# Patient Record
Sex: FEMALE | Race: WHITE | NOT HISPANIC OR LATINO | Employment: FULL TIME | URBAN - METROPOLITAN AREA
[De-identification: names, ages, dates, MRNs, and addresses within clinical notes are randomized per-mention and may not be internally consistent; named-entity substitution may affect disease eponyms.]

---

## 2020-08-03 ENCOUNTER — OFFICE VISIT (OUTPATIENT)
Dept: OBGYN CLINIC | Facility: CLINIC | Age: 60
End: 2020-08-03
Payer: COMMERCIAL

## 2020-08-03 ENCOUNTER — APPOINTMENT (OUTPATIENT)
Dept: RADIOLOGY | Facility: CLINIC | Age: 60
End: 2020-08-03
Payer: COMMERCIAL

## 2020-08-03 VITALS
TEMPERATURE: 98.1 F | SYSTOLIC BLOOD PRESSURE: 146 MMHG | BODY MASS INDEX: 35.78 KG/M2 | HEART RATE: 71 BPM | DIASTOLIC BLOOD PRESSURE: 82 MMHG | HEIGHT: 64 IN | WEIGHT: 209.6 LBS

## 2020-08-03 DIAGNOSIS — M25.512 LEFT SHOULDER PAIN, UNSPECIFIED CHRONICITY: ICD-10-CM

## 2020-08-03 DIAGNOSIS — S46.012A STRAIN OF LEFT ROTATOR CUFF CAPSULE, INITIAL ENCOUNTER: Primary | ICD-10-CM

## 2020-08-03 PROCEDURE — 20610 DRAIN/INJ JOINT/BURSA W/O US: CPT | Performed by: ORTHOPAEDIC SURGERY

## 2020-08-03 PROCEDURE — 73030 X-RAY EXAM OF SHOULDER: CPT

## 2020-08-03 PROCEDURE — 99204 OFFICE O/P NEW MOD 45 MIN: CPT | Performed by: ORTHOPAEDIC SURGERY

## 2020-08-03 RX ORDER — LATANOPROST 50 UG/ML
SOLUTION/ DROPS OPHTHALMIC
COMMUNITY
Start: 2020-06-02

## 2020-08-03 RX ORDER — LIDOCAINE HYDROCHLORIDE 10 MG/ML
4 INJECTION, SOLUTION INFILTRATION; PERINEURAL
Status: COMPLETED | OUTPATIENT
Start: 2020-08-03 | End: 2020-08-03

## 2020-08-03 RX ORDER — DEXAMETHASONE SODIUM PHOSPHATE 100 MG/10ML
40 INJECTION INTRAMUSCULAR; INTRAVENOUS
Status: COMPLETED | OUTPATIENT
Start: 2020-08-03 | End: 2020-08-03

## 2020-08-03 RX ADMIN — DEXAMETHASONE SODIUM PHOSPHATE 40 MG: 100 INJECTION INTRAMUSCULAR; INTRAVENOUS at 09:48

## 2020-08-03 RX ADMIN — LIDOCAINE HYDROCHLORIDE 4 ML: 10 INJECTION, SOLUTION INFILTRATION; PERINEURAL at 09:48

## 2020-08-03 NOTE — PROGRESS NOTES
Assessment/Plan:  1  Strain of left rotator cuff capsule, initial encounter  Ambulatory referral to Physical Therapy    Large joint arthrocentesis: L subacromial bursa   2  Left shoulder pain, unspecified chronicity  XR shoulder 2+ vw left       Scribe Attestation    I,:   Florin Funk am acting as a scribe while in the presence of the attending physician :        I,:   Sabine Gordon MD personally performed the services described in this documentation    as scribed in my presence :          X-rays of her left shoulder are overall benign  Upon physical examination, I do believe she is demonstrating signs and symptoms consistent with a left shoulder rotator cuff strain  I do believe we can begin treating her non operatively in the form of formal physical therapy  I did provide her with a prescription for this today in the office  She did inquire about a possible steroid injection, which I do believe is reasonable  I provided her with a subacromial steroid injection and she tolerated the procedure well  I do recommend that she ice her shoulder later today, as she may experience increased soreness  In addition, she may continue working without restriction and I did provide her with a work note indicating this  I will have her follow up back in the office in 4 weeks for repeat clinical evaluation  If she does not see progression at that time, we will consider ordering an MRI of her left shoulder to question a rotator cuff tear      Large joint arthrocentesis: L subacromial bursa  Date/Time: 8/3/2020 9:48 AM  Consent given by: patient  Site marked: site marked  Timeout: Immediately prior to procedure a time out was called to verify the correct patient, procedure, equipment, support staff and site/side marked as required   Supporting Documentation  Indications: pain   Procedure Details  Location: shoulder - L subacromial bursa  Preparation: Patient was prepped and draped in the usual sterile fashion  Needle size: 22 G  Ultrasound guidance: no  Approach: posterolateral  Medications administered: 4 mL lidocaine 1 %; 40 mg dexamethasone 100 mg/10 mL    Patient tolerance: patient tolerated the procedure well with no immediate complications  Dressing:  Sterile dressing applied        Subjective:   Lisa Addison is a 63 y o  female who presents to the office today for initial evaluation of left shoulder pain  She states 2 weeks ago she was changing out her air conditioner and experienced a sharp pain about her left shoulder  She states she has continued working since the time of injury and must take Aleve any use a max freeze cream daily for pain relief  At today's visit, she localizes majority of her pain along the superior aspect of her shoulder that radiates into her neck  She describes her pain as intermittent, achy, sore, occasionally sharp and moderate in intensity  She denies any previous injury to the shoulder  She notes icing and heating her shoulder as needed  She denies any radicular symptoms  She denies any numbness and tingling  Review of Systems   Constitutional: Positive for activity change  Negative for chills, fever and unexpected weight change  HENT: Negative for hearing loss, nosebleeds and sore throat  Eyes: Negative for pain, redness and visual disturbance  Respiratory: Negative for cough, shortness of breath and wheezing  Cardiovascular: Negative for chest pain, palpitations and leg swelling  Gastrointestinal: Negative for abdominal pain, nausea and vomiting  Endocrine: Negative for polydipsia and polyuria  Genitourinary: Negative for dysuria and hematuria  Musculoskeletal:        See HPI   Skin: Negative for rash and wound  Neurological: Negative for dizziness, numbness and headaches  Psychiatric/Behavioral: Negative for decreased concentration and suicidal ideas  The patient is not nervous/anxious            Past Medical History:   Diagnosis Date    Seasonal allergies        Past Surgical History:   Procedure Laterality Date    JOINT REPLACEMENT Left     knee       Family History   Problem Relation Age of Onset    Diabetes Mother     Heart disease Mother     Cancer Mother     Hypertension Father        Social History     Occupational History    Not on file   Tobacco Use    Smoking status: Never Smoker    Smokeless tobacco: Never Used   Substance and Sexual Activity    Alcohol use: Yes    Drug use: Never    Sexual activity: Not on file         Current Outpatient Medications:     latanoprost (XALATAN) 0 005 % ophthalmic solution, , Disp: , Rfl:     No Known Allergies    Objective:  Vitals:    08/03/20 0847   BP: 146/82   Pulse: 71   Temp: 98 1 °F (36 7 °C)       Left Shoulder Exam     Tenderness   The patient is experiencing no tenderness  Range of Motion   Passive abduction: 150   External rotation: 50   Forward flexion: 160   Internal rotation 0 degrees: L1   Internal rotation 90 degrees: 60     Muscle Strength   Abduction: 4/5   Internal rotation: 5/5   External rotation: 5/5     Tests   Ramírez test: negative  Impingement: positive (mild)  Drop arm: negative    Other   Erythema: absent  Scars: absent  Sensation: normal (Sensation to light touch throughout radial, ulnar and median nerve distributions )  Pulse: present (2+ radial)     Comments:    Empty Can (+)            Physical Exam   Constitutional: She is oriented to person, place, and time  She appears well-developed  HENT:   Head: Normocephalic and atraumatic  Eyes: Pupils are equal, round, and reactive to light  Conjunctivae are normal  Right eye exhibits no discharge  Left eye exhibits no discharge  Neck: Normal range of motion  Neck supple  Cardiovascular: Normal rate  Pulmonary/Chest: Effort normal  No respiratory distress  Musculoskeletal:      Comments: As noted in HPI  Neurological: She is alert and oriented to person, place, and time  Skin: Skin is warm and dry     Vitals reviewed  I have personally reviewed pertinent films in PACS and my interpretation is as follows:  X-rays of the left shoulder obtained on 08/03/2020 demonstrate no acute fracture, dislocation or osseous abnormality

## 2020-08-11 ENCOUNTER — APPOINTMENT (OUTPATIENT)
Dept: PHYSICAL THERAPY | Facility: CLINIC | Age: 60
End: 2020-08-11
Payer: COMMERCIAL

## 2020-08-11 ENCOUNTER — EVALUATION (OUTPATIENT)
Dept: PHYSICAL THERAPY | Facility: CLINIC | Age: 60
End: 2020-08-11
Payer: COMMERCIAL

## 2020-08-11 DIAGNOSIS — S46.012A STRAIN OF TENDON OF LEFT ROTATOR CUFF, INITIAL ENCOUNTER: Primary | ICD-10-CM

## 2020-08-11 PROCEDURE — 97162 PT EVAL MOD COMPLEX 30 MIN: CPT

## 2020-08-11 NOTE — PROGRESS NOTES
PT Evaluation     Today's date: 2020  Patient name: Charlee Grissom  : 1960  MRN: [id number]  Referring provider: Delfina Quiñones MD  Dx:   Encounter Diagnosis     ICD-10-CM    1  Strain of tendon of left rotator cuff, initial encounter  72) [id number] PT plan of care cert/re-cert       Start Time: 1700  Stop Time:  30 83  Total time in clinic (min): 45 minutes    Assessment  Assessment details: Patient is a 63year old female reporting to skilled PT with a diagnosed L rotator cuff pain and strain Based on evaluation today, patient presents with signs and symptoms consistent with these diagnosis  Patient presents with tenderness to palpation on the L upper trapezius as well as with her shoulder area  Patient notes pain with motions per objective, patient challenged with her overhead movements and functional limitations noted per objective  Patient notes that her most challenging motion is internal rotation and shows decreased strength with L rotator cuff motion, specifically with internal rotation  Patient advised to monitor overhead movements as well as advised to use tennis ball for upper trapezius spasm  Patient requires skilled PT to reduce shoulder pain, improve overhead motion, and to maximize function  Impairments: abnormal coordination, abnormal muscle firing, abnormal muscle tone, abnormal or restricted ROM, abnormal movement, activity intolerance, impaired physical strength, lacks appropriate home exercise program, pain with function, scapular dyskinesis, weight-bearing intolerance, poor posture  and poor body mechanics    Symptom irritability: lowUnderstanding of Dx/Px/POC: excellent   Prognosis: good    Goals  Short Term Goals- to be completed in 4 weeks or less  1  Patient will improve her FOTO score by 10 points or more in order to show a subjective amount of change in her function     2  Patient will improve her pain threshold by 2/10 or more in order to improve her function with workouts at home    3  Patient will improve her L shoulder ROM by 10 degrees or more in affected ROM planes in order to improve her overhead function  4  Patient will be independent in postural correction in order to reduce her postural faults  Long Term Goals- to be completed in 8 weeks or less  1  Patient will demonstrate an increase in L shoulder MMT but 1 grade or more with a pain free range in order to improve her strength in a pain free range  2  Patient will improve her L shoulder ROM by 20 degrees or more in each affected ROM in order to improve her shoulder motion for overhead motion  3  Patient will return to work pain free in order to improve her function at work  4  Patient will be independent in comprehensive HEP in order to manage her function outside of the clinic      Plan  Referral necessary: No  Planned modality interventions: biofeedback, cryotherapy, electrical stimulation/Russian stimulation, TENS and thermotherapy: hydrocollator packs  Planned therapy interventions: abdominal trunk stabilization, activity modification, ADL retraining, ADL training, balance, balance/weight bearing training, IADL retraining, joint mobilization, manual therapy, massage, Abel taping, motor coordination training, muscle pump exercises, neuromuscular re-education, patient education, postural training, body mechanics training, breathing training, canalith repositioning, compression, coordination, strengthening, stretching, fine motor coordination training, flexibility, functional ROM exercises, therapeutic activities, therapeutic exercise, therapeutic training, transfer training, gait training, graded activity, graded exercise, graded motor and home exercise program  Frequency: 2x week  Duration in weeks: 12  Plan of Care beginning date: 8/11/2020  Plan of Care expiration date: 11/3/2020  Treatment plan discussed with: patient        Subjective Evaluation    History of Present Illness  Date of onset: 2020  Mechanism of injury: trauma  Mechanism of injury: Patient is a 63year old female reporting to skilled PT with reports of L shoulder pain  According to Patient, she was lifting a large air conditioner at home, patient stated that she could not move her arm after a few minutes  Patient saw cortisone shot, patient states that it helped a little, 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, patient has been favoring her L shoulder and using more of her R in order to improve pain levels  Patient reports a "nagging pain"  Patient uses a heat and ice at home  Per MD note: "She states 2 weeks ago she was changing out her air conditioner and experienced a sharp pain about her left shoulder  She states she has continued working since the time of injury and must take Aleve any use a max freeze cream daily for pain relief  At today's visit, she localizes majority of her pain along the superior aspect of her shoulder that radiates into her neck  She describes her pain as intermittent, achy, sore, occasionally sharp and moderate in intensity  She denies any previous injury to the shoulder  She notes icing and heating her shoulder as needed  She denies any radicular symptoms    She denies any numbness and tingling "           Recurrent probem    Quality of life: excellent    Pain  Current pain rating: 3  At best pain ratin  At worst pain ratin  Location: L shoulder  Quality: dull ache  Relieving factors: change in position, heat, medications, ice, relaxation, support and rest  Aggravating factors: standing, overhead activity, keyboarding and lifting  Progression: no change    Social Support  Steps to enter house: yes  1  Stairs in house: yes   10  Lives in: multiple-level home  Lives with: alone    Employment status: working ( [employer])  Hand dominance: right      Diagnostic Tests  No diagnostic tests performed  Patient Goals  Patient goals for therapy: increased strength, independence with ADLs/IADLs, increased motion and decreased pain  Patient goal: Patient's goal is to improve her pain and to maximize her function  Objective     Static Posture     Head  Forward  Scapulae  Left winging, right winging, left protracted and right protracted  Postural Observations  Seated posture: fair  Standing posture: fair  Correction of posture: has no consistent effect        Palpation   Left   No palpable tenderness to the teres major and teres minor  Hypertonic in the cervical paraspinals, levator scapulae, pectoralis major, scalenes, sternocleidomastoid, suboccipitals, upper trapezius and masseter  Hypotonic in the lower trapezius, middle trapezius and serratus anterior  Muscle spasm in the rhomboids, scalenes, subscapularis and upper trapezius  Tenderness of the infraspinatus, subscapularis and supraspinatus  Trigger point to upper trapezius  Right   No palpable tenderness to the rhomboids, subscapularis, supraspinatus, teres major and teres minor  Hypertonic in the cervical paraspinals, pectoralis major, scalenes, sternocleidomastoid, suboccipitals, upper trapezius and masseter  Hypotonic in the levator scapulae, lower trapezius, middle trapezius and serratus anterior  Tenderness of the infraspinatus  Tenderness   Cervical Spine   Tenderness in the left scapula  No tenderness in the right scapula  Left Shoulder   Tenderness in the subscapularis tendon and supraspinatus tendon  No tenderness in the bicipital groove, clavicle, infraspinatus tendon and SC joint  Right Shoulder  No tenderness in the bicipital groove, clavicle, infraspinatus tendon, SC joint, subscapularis tendon and supraspinatus tendon       Neurological Testing     Sensation   Cervical/Thoracic   Left   Intact: light touch and sharp/dull discrimination    Right   Intact: light touch and sharp/dull discrimination    Reflexes   Left   Deltoid (C5): normal (2+)  Biceps (C5/C6): normal (2+)  Brachioradialis (C6): normal (2+)  Triceps (C7): normal (2+)  Lynn's reflex: negative    Right   Deltoid (C5): normal (2+)  Biceps (C5/C6): normal (2+)  Brachioradialis (C6): normal (2+)  Triceps (C7): normal (2+)  Lynn's reflex: negative    Active Range of Motion   Cervical/Thoracic Spine       Cervical    Left lateral flexion: 45 degrees      Right lateral flexion: 30 degrees     with pain Restriction level moderate  Left Shoulder   Flexion: WFL and with pain  Extension: WFL  Abduction: 165 degrees with pain  External rotation 0°: WFL  External rotation 45°: WFL  External rotation 90°: WFL  Internal rotation 90°: 70 degrees with pain    Right Shoulder   Flexion: WFL  Extension: WFL  Abduction: WFL    Joint Play     Hypomobile: C3, C4, C5, C6 and C7     Strength/Myotome Testing     Left Shoulder     Planes of Motion   Flexion: 4   Extension: 4+   Abduction: 4   Adduction: 4+   External rotation at 0°: 4   External rotation at 45°: 4+   External rotation at 90°: 4   External rotation BTH: 4+   Internal rotation at 0°: 4   Internal rotation at 45°: 4   Internal rotation at 90°: 4-   Internal rotation BTB: 4-     Isolated Muscles   Biceps: 5   Lower trapezius: 3+   Middle trapezius: 4-   Serratus anterior: 4-   Subscapularis: 4-   Supraspinatus: 4+   Triceps: 5   Upper trapezius: 5     Right Shoulder     Planes of Motion   Flexion: 4+   Extension: 4+   Abduction: 4+   Adduction: 4+   External rotation at 0°: 4+   External rotation at 45°: 4+   External rotation at 90°: 4+   External rotation BTH: 4+   Internal rotation at 0°: 4+   Internal rotation at 45°: 4+   Internal rotation at 90°: 4+     Isolated Muscles   Biceps: 5   Lower trapezius: 3+   Middle trapezius: 4-   Serratus anterior: 4-   Supraspinatus: 4+   Triceps: 5   Upper trapezius: 5     Tests     Left Shoulder   Negative belly press, drop arm, lift-off and Neer's  Right Shoulder   Negative belly press, drop arm, lift-off and Neer's  Flowsheet Rows      Most Recent Value   PT/OT G-Codes   Current Score  41   Projected Score  64             Precautions:   Past Medical History:   Diagnosis Date    Seasonal allergies            Manuals                                                                 Neuro Re-Ed                                                                                                        Ther Ex                                                                                                                     Ther Activity                                       Gait Training                                       Modalities

## 2020-08-17 ENCOUNTER — APPOINTMENT (OUTPATIENT)
Dept: PHYSICAL THERAPY | Facility: CLINIC | Age: 60
End: 2020-08-17
Payer: COMMERCIAL

## 2020-08-19 ENCOUNTER — APPOINTMENT (OUTPATIENT)
Dept: RADIOLOGY | Facility: CLINIC | Age: 60
End: 2020-08-19
Payer: COMMERCIAL

## 2020-08-19 ENCOUNTER — OFFICE VISIT (OUTPATIENT)
Dept: OBGYN CLINIC | Facility: CLINIC | Age: 60
End: 2020-08-19
Payer: COMMERCIAL

## 2020-08-19 VITALS
SYSTOLIC BLOOD PRESSURE: 154 MMHG | HEIGHT: 64 IN | DIASTOLIC BLOOD PRESSURE: 89 MMHG | BODY MASS INDEX: 35.78 KG/M2 | WEIGHT: 209.6 LBS | HEART RATE: 80 BPM | TEMPERATURE: 99.2 F

## 2020-08-19 DIAGNOSIS — M25.552 LEFT HIP PAIN: ICD-10-CM

## 2020-08-19 DIAGNOSIS — M70.62 GREATER TROCHANTERIC BURSITIS OF LEFT HIP: Primary | ICD-10-CM

## 2020-08-19 PROCEDURE — 73502 X-RAY EXAM HIP UNI 2-3 VIEWS: CPT

## 2020-08-19 PROCEDURE — 99214 OFFICE O/P EST MOD 30 MIN: CPT | Performed by: ORTHOPAEDIC SURGERY

## 2020-08-19 NOTE — PROGRESS NOTES
Assessment/Plan:  1  Greater trochanteric bursitis of left hip  Ambulatory referral to Physical Therapy    diclofenac sodium (VOLTAREN) 1 %   2  Left hip pain  XR hip/pelv 2-3 vws left if performed    Ambulatory referral to Physical Therapy    diclofenac sodium (VOLTAREN) 1 %     Scribe Attestation    I,:   Leodan Little am acting as a scribe while in the presence of the attending physician :        I,:   Kacey Bedoya, DO personally performed the services described in this documentation    as scribed in my presence :          Sanjuanita Osborn is a very pleasant 60-year-old female who presents today for initial evaluation of her left hip pain  After reviewing her imaging and performing a thorough history and physical exam, I explained that she is suffering with greater trochanteric bursitis about her left hip  We discussed treatment options and I recommended initiating formal physical therapy  I did provide her with a prescription for this today in the office  I also provided her with a prescription for Voltaren gel and instructions for use  I did also recommend that she take the next few days off from work in order to allow the medication to begin to take effect  She may return to work on 08/24/2020 without restriction  A note was provided for her employer  I would like to see her back in six weeks for repeat clinical evaluation  If her symptoms persist, we can consider corticosteroid injection  Subjective:  Initial evaluation for left hip pain    Patient ID: Barbara Connolly is a 63 y o  female who presents today for initial evaluation of her left hip pain  She states that she awoke this past weekend and began to experience sharp and severe pain about the lateral aspect of her left hip  She finds this pain is worse with twisting, walking, and most activities  She does state that the pain can radiate into her lateral upper leg at times    She does report a history of left total knee arthroplasty performed by Dr Ignacio Krueger  She denies any acute injury or trauma  She denies any distal paresthesias of the lower extremity  She denies any pain about her groin  Review of Systems   Constitutional: Positive for activity change  Negative for chills, fever and unexpected weight change  HENT: Negative for hearing loss, nosebleeds and sore throat  Eyes: Negative for pain, redness and visual disturbance  Respiratory: Negative for cough, shortness of breath and wheezing  Cardiovascular: Negative for chest pain, palpitations and leg swelling  Gastrointestinal: Negative for abdominal pain, nausea and vomiting  Endocrine: Negative for polydipsia and polyuria  Genitourinary: Negative for dysuria and hematuria  Musculoskeletal: Positive for arthralgias  Negative for joint swelling and myalgias  See HPI   Skin: Negative for rash and wound  Neurological: Negative for dizziness, numbness and headaches  Psychiatric/Behavioral: Negative for decreased concentration and suicidal ideas  The patient is not nervous/anxious            Past Medical History:   Diagnosis Date    Seasonal allergies        Past Surgical History:   Procedure Laterality Date    JOINT REPLACEMENT Left     knee       Family History   Problem Relation Age of Onset    Diabetes Mother     Heart disease Mother     Cancer Mother     Hypertension Father        Social History     Occupational History    Not on file   Tobacco Use    Smoking status: Never Smoker    Smokeless tobacco: Never Used   Substance and Sexual Activity    Alcohol use: Yes     Frequency: 4 or more times a week     Drinks per session: 1 or 2     Binge frequency: Daily or almost daily    Drug use: Not Currently    Sexual activity: Not on file         Current Outpatient Medications:     latanoprost (XALATAN) 0 005 % ophthalmic solution, , Disp: , Rfl:     Naproxen Sodium (ALEVE PO), Take by mouth, Disp: , Rfl:     diclofenac sodium (VOLTAREN) 1 %, Apply 2 g topically 4 (four) times a day, Disp: 1 Tube, Rfl: 1    No Known Allergies    Objective:  Vitals:    08/19/20 1122   BP: 154/89   Pulse: 80   Temp: 99 2 °F (37 3 °C)       Body mass index is 36 26 kg/m²  Left Hip Exam     Tenderness   The patient is experiencing tenderness in the greater trochanter (and proximal IT band)  Range of Motion   Abduction: normal   Adduction: normal   Flexion: normal   External rotation: normal   Internal rotation: normal     Muscle Strength   Abduction: 5/5   Adduction: 5/5   Flexion: 5/5     Tests   ESA: negative    Other   Erythema: absent  Scars: absent  Sensation: normal  Pulse: present    Comments:  Stinchfield: positive (lateral pain)  Log roll: negative  FADIR: negative            Physical Exam  Vitals signs and nursing note reviewed  Constitutional:       Appearance: She is well-developed  HENT:      Head: Normocephalic and atraumatic  Eyes:      Conjunctiva/sclera: Conjunctivae normal       Pupils: Pupils are equal, round, and reactive to light  Neck:      Musculoskeletal: Normal range of motion and neck supple  Cardiovascular:      Rate and Rhythm: Normal rate  Pulmonary:      Effort: Pulmonary effort is normal  No respiratory distress  Musculoskeletal:      Comments: As noted in HPI   Skin:     General: Skin is warm and dry  Neurological:      Mental Status: She is alert and oriented to person, place, and time  Psychiatric:         Behavior: Behavior normal          I have personally reviewed pertinent films in PACS  X-ray of the left hip obtained on 08/19/2020 demonstrates mild degenerative changes evidence by sclerosis and joint space narrowing with greater than 50% joint space remaining  There is no acute fracture, dislocation, lytic or blastic lesion

## 2020-08-19 NOTE — LETTER
August 19, 2020     Patient: Tiffanie Pérez   YOB: 1960   Date of Visit: 8/19/2020       To Whom it May Concern:    Claudealetharoxi Dentthom is under my professional care  She was seen in my office on 8/19/2020  She may return to work on 08/24/2020  If you have any questions or concerns, please don't hesitate to call           Sincerely,          Helena Greene,         CC: No Recipients

## 2020-08-20 NOTE — PROGRESS NOTES
Self Discharge    Patient has requested to discharge due to a high co pay and high deductible, cannot afford at this time

## 2020-08-25 ENCOUNTER — APPOINTMENT (OUTPATIENT)
Dept: PHYSICAL THERAPY | Facility: CLINIC | Age: 60
End: 2020-08-25
Payer: COMMERCIAL

## 2020-08-31 ENCOUNTER — APPOINTMENT (OUTPATIENT)
Dept: PHYSICAL THERAPY | Facility: CLINIC | Age: 60
End: 2020-08-31
Payer: COMMERCIAL

## 2020-09-08 ENCOUNTER — TELEPHONE (OUTPATIENT)
Dept: OTHER | Facility: OTHER | Age: 60
End: 2020-09-08

## 2020-09-08 NOTE — TELEPHONE ENCOUNTER
Inbound Calls to Reschedule/Cancel Appointments   Patient Details:  Dagoberto Quiñones  1960  [id number]     Who is calling? Ladi Kasper   Date of original appointment 9/9/2020   Visit Type Follow Up   Who is the Provider and Location? Dr Rogelio Tran @ [address] [place] [address]   The patient would like to cancel, reschedule or make into a virtual appointment?  Reschedule   Reason for rescheduling or cancelation? work

## 2021-04-17 ENCOUNTER — IMMUNIZATIONS (OUTPATIENT)
Dept: FAMILY MEDICINE CLINIC | Facility: HOSPITAL | Age: 61
End: 2021-04-17

## 2021-04-17 DIAGNOSIS — Z23 ENCOUNTER FOR IMMUNIZATION: Primary | ICD-10-CM

## 2021-04-17 PROCEDURE — 0001A SARS-COV-2 / COVID-19 MRNA VACCINE (PFIZER-BIONTECH) 30 MCG: CPT

## 2021-04-17 PROCEDURE — 91300 SARS-COV-2 / COVID-19 MRNA VACCINE (PFIZER-BIONTECH) 30 MCG: CPT

## 2021-05-08 ENCOUNTER — IMMUNIZATIONS (OUTPATIENT)
Dept: FAMILY MEDICINE CLINIC | Facility: HOSPITAL | Age: 61
End: 2021-05-08

## 2021-05-08 DIAGNOSIS — Z23 ENCOUNTER FOR IMMUNIZATION: Primary | ICD-10-CM

## 2021-05-08 PROCEDURE — 0002A SARS-COV-2 / COVID-19 MRNA VACCINE (PFIZER-BIONTECH) 30 MCG: CPT

## 2021-05-08 PROCEDURE — 91300 SARS-COV-2 / COVID-19 MRNA VACCINE (PFIZER-BIONTECH) 30 MCG: CPT

## 2023-01-12 ENCOUNTER — APPOINTMENT (OUTPATIENT)
Dept: RADIOLOGY | Facility: CLINIC | Age: 63
End: 2023-01-12

## 2023-01-12 ENCOUNTER — OCCMED (OUTPATIENT)
Dept: URGENT CARE | Facility: CLINIC | Age: 63
End: 2023-01-12

## 2023-01-12 DIAGNOSIS — M23.300 DEGENERATIVE TEAR OF LATERAL MENISCUS OF RIGHT KNEE: Primary | ICD-10-CM

## 2023-01-12 DIAGNOSIS — M23.300 DEGENERATIVE TEAR OF LATERAL MENISCUS OF RIGHT KNEE: ICD-10-CM

## 2023-01-16 ENCOUNTER — APPOINTMENT (OUTPATIENT)
Dept: URGENT CARE | Facility: CLINIC | Age: 63
End: 2023-01-16

## 2023-01-19 ENCOUNTER — OFFICE VISIT (OUTPATIENT)
Dept: OBGYN CLINIC | Facility: CLINIC | Age: 63
End: 2023-01-19

## 2023-01-19 VITALS
HEART RATE: 82 BPM | WEIGHT: 190 LBS | TEMPERATURE: 99.1 F | HEIGHT: 64 IN | DIASTOLIC BLOOD PRESSURE: 102 MMHG | BODY MASS INDEX: 32.44 KG/M2 | SYSTOLIC BLOOD PRESSURE: 158 MMHG

## 2023-01-19 DIAGNOSIS — S83.241A OTHER TEAR OF MEDIAL MENISCUS, CURRENT INJURY, RIGHT KNEE, INITIAL ENCOUNTER: Primary | ICD-10-CM

## 2023-01-19 NOTE — PROGRESS NOTES
Orthopaedics Office Visit - New Patient Visit    ASSESSMENT/PLAN:    Assessment:   65year old female with right knee suspected meniscus tear sp twisting injury    Plan:   · Order placed today for patient to initiate outpatient Physical Therapy  · Patient provided a short hinged knee brace today to be worn as needed  · Patient was offered, accepted, and received a cortisone injection of the right knee  Risks and benefits of CSI were discussed with the patient  The corticosteroid injection was administered without any immediate complication and was well tolerated by the patient  This was done under sterile technique  Post injection instructions and expectations were discussed  It was explained to the patient that cortisone injections can be repeated as early as every 3 months  · A note was provided for the patient to remain completely out of work for the next three weeks  She may return to light duty 2/13/23  Return to work status should be re-evaluated at her next visit in 4 weeks  · If minimal to no improvement at her next visit, consider ordering MRI to further evaluate for meniscal tear or other injuries  To Do Next Visit:  Re-evaluation, anticipate full return to work    _____________________________________________________  CHIEF COMPLAINT:  Chief Complaint   Patient presents with   • Right Knee - Pain         SUBJECTIVE:  Kristi Howard is a 65 y o  female who presents for evaluation of right knee pain that began one week ago on 1/12/2022 while reaching to the contralateral side reaching for something and felt a crunch in her knee  She was in immediate pain and swelling, and unable to bear weight  Was seen and evaluated on the same day by [facility] at a [address] [place] [address] location where x-rays were obtained, she was provided crutches, and she was referred to Orthopedics for follow up care  Today she states her pain has been rated 7/10, sharp in nature, intermittent   She is currently using a walker for ambulatory assistance  Her pain is aggravated with any activity, ascending and descending stairs  She has used ice as needed for pain with minimal relief  She has found relief of arthritic knee pain with a three shot VISCO series 8 years ago  History of left knee replacement 7 years ago with good outcome  Denies paraesthesias, secondary injury, previous injuries or surgeries  Notes a clicking sensation in the knee  Taking Ibuprofen as needed for pain  She states she did not feel stable using crutches so she has transitioned to a cane for ambulatory assistance  PAST MEDICAL HISTORY:  Past Medical History:   Diagnosis Date   • Seasonal allergies        PAST SURGICAL HISTORY:  Past Surgical History:   Procedure Laterality Date   • JOINT REPLACEMENT Left     knee       FAMILY HISTORY:  Family History   Problem Relation Age of Onset   • Diabetes Mother    • Heart disease Mother    • Cancer Mother    • Hypertension Father        SOCIAL HISTORY:  Social History     Tobacco Use   • Smoking status: Never   • Smokeless tobacco: Never   Vaping Use   • Vaping Use: Never used   Substance Use Topics   • Alcohol use: Yes   • Drug use: Not Currently       MEDICATIONS:    Current Outpatient Medications:   •  diclofenac sodium (VOLTAREN) 1 %, Apply 2 g topically 4 (four) times a day, Disp: 1 Tube, Rfl: 1  •  latanoprost (XALATAN) 0 005 % ophthalmic solution, , Disp: , Rfl:   •  Naproxen Sodium (ALEVE PO), Take by mouth, Disp: , Rfl:     ALLERGIES:  No Known Allergies    REVIEW OF SYSTEMS:  MSK: right knee pain  Neuro: none  Pertinent items are otherwise noted in HPI  A comprehensive review of systems was otherwise negative      LABS:  HgA1c: No results found for: HGBA1C  BMP: No results found for: GLUCOSE, CALCIUM, NA, K, CO2, CL, BUN, CREATININE  CBC: No components found for: CBC    _____________________________________________________  PHYSICAL EXAMINATION:  Vital signs: BP (!) 158/102   Pulse 82   Temp 99 1 °F (37 3 °C)  5' 3 75" (1 619 m)   Wt 86 2 kg (190 lb)   BMI 32 87 kg/m²   General: No acute distress, awake and alert  Psychiatric: Mood and affect appear appropriate  HEENT: Trachea Midline, No torticollis, no apparent facial trauma  Cardiovascular: No audible murmurs; Extremities appear perfused  Pulmonary: No audible wheezing or stridor  Skin: No open lesions; see further details (if any) below    MUSCULOSKELETAL EXAMINATION:  Extremities:   Right Knee Exam  Alignment:  Normal knee alignment  Inspection:  mild swelling  No erythema  No ecchymosis  No muscle atrophy  No deformity  Palpation:  mild tenderness at medial joint line, posteromedial corner  ROM:  Knee Extension 10  Knee Flexion 100  Strength:  5/5 quadriceps and hamstrings  Stability:  No objective knee instability  Stable Varus / Valgus stress, Lachman, and Posterior drawer  Tests:  (+) Himanshu  Patella:  Not tested  Neurovascular:  Sensation intact in DP/SP/Mcdonnell/Sa/T nerve distributions  2+ DP & PT pulses  Brisk capillary refill in all toes    Gait:  Not tested         _____________________________________________________  STUDIES REVIEWED:  I personally reviewed the images and interpretation is as follows:  No acute osseous abnormalities    PROCEDURES PERFORMED:  Procedures      Scribe Attestation    I,:  Namrata Moon am acting as a scribe while in the presence of the attending physician :       I,:  Royal Channing MD personally performed the services described in this documentation    as scribed in my presence :

## 2023-01-19 NOTE — LETTER
January 19, 2023     Patient: Sofía Drake  YOB: 1960  Date of Visit: 1/19/2023      To Whom it May Concern:    Avni Mccrary is under my professional care  Efra Carbajal was seen in my office on 1/19/2023  Efra Carbajal may not return to work at this time  She may return to work on 2/13/2023 with the following accomodations: allow her to sit as needed during a shift, no more than 4 hours of standing or walking over one shift  Her return to work status will be re-evaluated at her next visit in 4 weeks  If you have any questions or concerns, please don't hesitate to call           Sincerely,          Ld De MD        CC: No Recipients

## 2023-01-23 ENCOUNTER — APPOINTMENT (OUTPATIENT)
Dept: URGENT CARE | Facility: CLINIC | Age: 63
End: 2023-01-23

## 2023-02-01 ENCOUNTER — TELEPHONE (OUTPATIENT)
Dept: OBGYN CLINIC | Facility: HOSPITAL | Age: 63
End: 2023-02-01

## 2023-02-01 NOTE — TELEPHONE ENCOUNTER
Caller: Louann Hall     Doctor: Saskia Damon    Reason for call: Patient called she will  her note tomorrow afternoon       Call back#:

## 2023-02-01 NOTE — TELEPHONE ENCOUNTER
Caller: Patient    Doctor/Office: RAJESH/MG    #: [phone]      What needs to be faxed: PT script    ATTN to: Silver Pineda    Fax#: [phone]

## 2023-02-01 NOTE — TELEPHONE ENCOUNTER
Caller: Patient    Doctor: Tyrone Tucker    Reason for call: patient is starting PT tomorrow and it supposed to return to work 2/13  She would like to know if she can push the return to work out further so she can get some more PT in?     Call back#: [phone]

## 2023-02-01 NOTE — LETTER
February 1, 2023      Patient:            Willis Fortune  YOB: 1960  Date of Visit:    1/19/23        To Whom it May Concern:     Dudley Pereyra is under my professional care  Mariajose Stratton was seen in the office for Dr Wolfe Officer on 1/19/23   Mariajose Stratton may not return to work until otherwise stated      If you have any questions or concerns, please don't hesitate to call            Sincerely,         Dr Wolfe Officer, M D

## 2023-02-01 NOTE — TELEPHONE ENCOUNTER
Caller: Nabil Hernandez    Doctor: Raheem Carr    Reason for call: Returning call    Call back#: [phone]

## 2023-02-03 NOTE — TELEPHONE ENCOUNTER
I will re-print letter  Dr Lupe MATHIAS wrote the note but we do not have a stamp for [facility]  We will re-enter letter with Dr Lupe Byrd name and signature for patient to

## 2023-02-03 NOTE — TELEPHONE ENCOUNTER
Caller: Patient    Doctor: Rainer Jacobo    Reason for call: Patient called stating they would like another work letter with [employer], Patient is worried employer wont accept with PA-C signature   Patient asks if the letter can be more specific on the return to work, Please adivse    Call back#: [phone]

## 2023-02-16 ENCOUNTER — OFFICE VISIT (OUTPATIENT)
Dept: OBGYN CLINIC | Facility: CLINIC | Age: 63
End: 2023-02-16

## 2023-02-16 VITALS
HEART RATE: 83 BPM | BODY MASS INDEX: 38.58 KG/M2 | DIASTOLIC BLOOD PRESSURE: 95 MMHG | SYSTOLIC BLOOD PRESSURE: 151 MMHG | HEIGHT: 64 IN | WEIGHT: 226 LBS

## 2023-02-16 DIAGNOSIS — M17.11 PRIMARY OSTEOARTHRITIS OF RIGHT KNEE: ICD-10-CM

## 2023-02-16 DIAGNOSIS — S83.241A OTHER TEAR OF MEDIAL MENISCUS, CURRENT INJURY, RIGHT KNEE, INITIAL ENCOUNTER: Primary | ICD-10-CM

## 2023-02-16 NOTE — LETTER
February 16, 2023     Patient: Luiz Anne  YOB: 1960  Date of Visit: 2/16/2023      To Whom it May Concern:    Bobo Johnston is under my professional care  Birdie Hinds was seen in my office on 2/16/2023  Birdie Hinds may return to work on 3/6/23 with the following restrictions  No stairs  Begin with 6 hour work days at that time  Progress to 8 hour work days pending symptoms 3/20/23  If you have any questions or concerns, please don't hesitate to call           Sincerely,          Adelfa Boast, MD

## 2023-02-16 NOTE — PATIENT INSTRUCTIONS
-Weightbearing as tolerated right lower extremity  -Order placed for Euflexxa #3 right knee  -Continue physical therapy as directed  - Over the counter analgesics as needed / directed   - Ice / heat as directed   - Follow up for Euflexxa #1 right knee

## 2023-02-16 NOTE — PROGRESS NOTES
Orthopaedics Office Visit - Follow up Patient Visit    ASSESSMENT/PLAN:    Assessment:   Right knee osteoarthritis   Suspected medial meniscus tear s/p twisting injury   1/12/23 initially however now primarily complains of pain on stairs, less so mechanical symptoms  Continued medial sided knee pain       Plan:   -Weightbearing as tolerated right lower extremity  -Order placed for Euflexxa #3 right knee  -Continue physical therapy as directed  - Over the counter analgesics as needed / directed   - Ice / heat as directed   - Updated work note placed in system - patient will return to work on graduated system as outlined in letter  - Follow up for Euflexxa #1 right knee       To Do Next Visit:  Euflexxa #1 right knee     _____________________________________________________  CHIEF COMPLAINT:  Chief Complaint   Patient presents with   • Right Knee - Follow-up         SUBJECTIVE:  Britney García is a 65 y o  female who presents to the office for evaluation of her right knee  Patient states that her knee is doing slightly better in comparison to previous examination  Patient is approximately 4 weeks status post cortisone injection to the right knee  Patient states that she continues to have anterior and posterior knee pain that becomes worse with going up and down stairs  Patient states that she has been attending approximately 2 weeks of physical therapy with good results  Patient states that she has been taking ibuprofen and Tylenol as needed for pain  Patient does note a popping type sensation in her knee  Patient works as a   Patient offers no other complaints at this time         PAST MEDICAL HISTORY:  Past Medical History:   Diagnosis Date   • Seasonal allergies        PAST SURGICAL HISTORY:  Past Surgical History:   Procedure Laterality Date   • JOINT REPLACEMENT Left     knee       FAMILY HISTORY:  Family History   Problem Relation Age of Onset   • Diabetes Mother    • Heart disease Mother    • Cancer Mother    • Hypertension Father        SOCIAL HISTORY:  Social History     Tobacco Use   • Smoking status: Never   • Smokeless tobacco: Never   Vaping Use   • Vaping Use: Never used   Substance Use Topics   • Alcohol use: Yes   • Drug use: Not Currently       MEDICATIONS:    Current Outpatient Medications:   •  diclofenac sodium (VOLTAREN) 1 %, Apply 2 g topically 4 (four) times a day, Disp: 1 Tube, Rfl: 1  •  latanoprost (XALATAN) 0 005 % ophthalmic solution, , Disp: , Rfl:   •  Naproxen Sodium (ALEVE PO), Take by mouth, Disp: , Rfl:     ALLERGIES:  No Known Allergies    REVIEW OF SYSTEMS:  MSK: right knee pain   Neuro: WNL   Pertinent items are otherwise noted in HPI  A comprehensive review of systems was otherwise negative  LABS:  HgA1c: No results found for: HGBA1C  BMP: No results found for: GLUCOSE, CALCIUM, NA, K, CO2, CL, BUN, CREATININE  CBC: No components found for: CBC    _____________________________________________________  PHYSICAL EXAMINATION:  Vital signs: /95   Pulse 83   Ht 5' 3 75" (1 619 m)   Wt 103 kg (226 lb)   BMI 39 10 kg/m²   General: No acute distress, awake and alert  Psychiatric: Mood and affect appear appropriate  HEENT: Trachea Midline, No torticollis, no apparent facial trauma  Cardiovascular: No audible murmurs; Extremities appear perfused  Pulmonary: No audible wheezing or stridor  Skin: No open lesions; see further details (if any) below      MUSCULOSKELETAL EXAMINATION:  Right knee examination:  - Patient sitting comfortably in the office in no acute distress   -No acute visible abnormalities present in the right knee  Extremity appears well-perfused overall   -Tenderness palpation noted over the medial joint line    No other bony or soft tissue tenderness palpation noted at this time   -0 to 110 degrees of range of motion limited by pain  - Special Tests       -Ligamentously intact in all planes, equivocal medial Himanshu's examination  - NV intact    _____________________________________________________  STUDIES REVIEWED:  I personally reviewed the images and interpretation is as follows:  N/a         PROCEDURES PERFORMED:  No procedures were performed at this time  Dawna Kussmaul, PA-C - assisting  Maria C Clement                  Portions of the record may have been created with voice recognition software  Occasional wrong word or "sound a like" substitutions may have occurred due to the inherent limitations of voice recognition software  Read the chart carefully and recognize, using context, where substitutions have occurred

## 2023-02-21 ENCOUNTER — TELEPHONE (OUTPATIENT)
Dept: OBGYN CLINIC | Facility: CLINIC | Age: 63
End: 2023-02-21

## 2023-02-21 NOTE — TELEPHONE ENCOUNTER
Caller: Jey cordova     Doctor: [facility]     Reason for call: Called to check if any upcoming appts     Call back#: n/a

## 2023-02-27 ENCOUNTER — TELEPHONE (OUTPATIENT)
Dept: OBGYN CLINIC | Facility: CLINIC | Age: 63
End: 2023-02-27

## 2023-02-27 NOTE — TELEPHONE ENCOUNTER
LMOM that I was able to get her Return to Work Auth Form to go through and faxed, the FMLA form I was unable to fax to [employer] and or the number she gave on her form she filled out from our office  I called to advise to  both forms to give to her employer

## 2023-03-02 ENCOUNTER — PROCEDURE VISIT (OUTPATIENT)
Dept: OBGYN CLINIC | Facility: CLINIC | Age: 63
End: 2023-03-02

## 2023-03-02 VITALS
HEIGHT: 64 IN | DIASTOLIC BLOOD PRESSURE: 76 MMHG | HEART RATE: 75 BPM | WEIGHT: 226 LBS | SYSTOLIC BLOOD PRESSURE: 128 MMHG | BODY MASS INDEX: 38.58 KG/M2 | TEMPERATURE: 98.7 F

## 2023-03-02 DIAGNOSIS — M17.11 PRIMARY OSTEOARTHRITIS OF RIGHT KNEE: Primary | ICD-10-CM

## 2023-03-02 RX ORDER — HYALURONATE SODIUM 10 MG/ML
20 SYRINGE (ML) INTRAARTICULAR
Status: COMPLETED | OUTPATIENT
Start: 2023-03-02 | End: 2023-03-02

## 2023-03-02 RX ADMIN — Medication 20 MG: at 08:29

## 2023-03-02 NOTE — PROGRESS NOTES
[facility]      NAME: Tianna Sánchez is a 65 y o  female  : 1960    MRN: [id number]  DATE: 2023  TIME: 8:29 AM    Assessment and Plan   Primary osteoarthritis of right knee [M17 11]  1  Primary osteoarthritis of right knee  Large joint arthrocentesis: R knee          Large joint arthrocentesis: R knee  Universal Protocol:  Consent: Verbal consent obtained  Risks and benefits: risks, benefits and alternatives were discussed  Consent given by: patient  Patient understanding: patient states understanding of the procedure being performed  Site marked: the operative site was marked  Patient identity confirmed: verbally with patient    Supporting Documentation  Indications: pain   Procedure Details  Location: knee - R knee  Preparation: Patient was prepped and draped in the usual sterile fashion  Needle size: 22 G  Ultrasound guidance: no  Approach: anterolateral  Medications administered: 20 mg Sodium Hyaluronate 20 MG/2ML  Specialty Pharmacy Supplied: received medications from pharmacy  Patient tolerance: patient tolerated the procedure well with no immediate complications  Dressing:  Sterile dressing applied          Patient Instructions     - Follow up 1 week for Euflexxa #2 right knee   - Over the counter analgesics as needed / directed   - Ice / heat as directed       Chief Complaint     Chief Complaint   Patient presents with   • Right Knee - Follow-up         History of Present Illness       Patient is a 61-year-old female presents to the office for viscosupplementation  Euflexxa #1 right knee  Patient denies any new or worsening symptoms in the right knee  Patient offers no other complaints at this time           Current Medications       Current Outpatient Medications:   •  diclofenac sodium (VOLTAREN) 1 %, Apply 2 g topically 4 (four) times a day, Disp: 1 Tube, Rfl: 1  •  latanoprost (XALATAN) 0 005 % ophthalmic solution, , Disp: , Rfl:   •  Naproxen Sodium (ALEVE PO), Take by mouth, Disp: , Rfl:     Current Allergies     Allergies as of 03/02/2023   • (No Known Allergies)            The following portions of the patient's history were reviewed and updated as appropriate: allergies, current medications, past family history, past medical history, past social history, past surgical history and problem list      Past Medical History:   Diagnosis Date   • Primary osteoarthritis of right knee 2/16/2023   • Seasonal allergies        Past Surgical History:   Procedure Laterality Date   • JOINT REPLACEMENT Left     knee       Family History   Problem Relation Age of Onset   • Diabetes Mother    • Heart disease Mother    • Cancer Mother    • Hypertension Father          Medications have been verified  Objective   /76   Pulse 75   Temp 98 7 °F (37 1 °C)   Ht 5' 3 75" (1 619 m)   Wt 103 kg (226 lb)   BMI 39 10 kg/m²   No LMP recorded

## 2023-03-09 ENCOUNTER — PROCEDURE VISIT (OUTPATIENT)
Dept: OBGYN CLINIC | Facility: CLINIC | Age: 63
End: 2023-03-09

## 2023-03-09 VITALS
TEMPERATURE: 98.1 F | WEIGHT: 226 LBS | HEART RATE: 69 BPM | BODY MASS INDEX: 38.58 KG/M2 | DIASTOLIC BLOOD PRESSURE: 72 MMHG | HEIGHT: 64 IN | SYSTOLIC BLOOD PRESSURE: 135 MMHG

## 2023-03-09 DIAGNOSIS — M17.11 PRIMARY OSTEOARTHRITIS OF RIGHT KNEE: Primary | ICD-10-CM

## 2023-03-09 RX ORDER — HYALURONATE SODIUM 10 MG/ML
20 SYRINGE (ML) INTRAARTICULAR
Status: COMPLETED | OUTPATIENT
Start: 2023-03-09 | End: 2023-03-09

## 2023-03-09 RX ADMIN — Medication 20 MG: at 13:19

## 2023-03-09 NOTE — LETTER
March 9, 2023     Patient: Maricruz Keating  YOB: 1960  Date of Visit: 3/9/2023      To Whom it May Concern:    Michela Brown is under my professional care  Donald Topher was seen in my office on 3/9/2023  Tellyroxi Necessary may return to work on 3/9/23 with the following restrictions  No stairs  Continue with 6 hour work days at that time  Progress to 8 hour work days pending symptoms 4/3/23  If you have any questions or concerns, please don't hesitate to call           Sincerely,          Ny Augustine MD        CC: No Recipients

## 2023-03-09 NOTE — PROGRESS NOTES
[facility]      NAME: Nubia Kulkarni is a 65 y o  female  : 1960    MRN: [id number]  DATE: 2023  TIME: 1:19 PM    Assessment and Plan   Primary osteoarthritis of right knee [M17 11]  1  Primary osteoarthritis of right knee  Large joint arthrocentesis: R knee            Large joint arthrocentesis: R knee  Universal Protocol:  Consent: Verbal consent obtained  Risks and benefits: risks, benefits and alternatives were discussed  Consent given by: patient  Patient understanding: patient states understanding of the procedure being performed  Site marked: the operative site was marked  Patient identity confirmed: verbally with patient    Supporting Documentation  Indications: pain   Procedure Details  Location: knee - R knee  Preparation: Patient was prepped and draped in the usual sterile fashion  Needle size: 22 G  Ultrasound guidance: no  Approach: anterolateral  Medications administered: 20 mg Sodium Hyaluronate 20 MG/2ML  Specialty Pharmacy Supplied: received medications from pharmacy  Patient tolerance: patient tolerated the procedure well with no immediate complications  Dressing:  Sterile dressing applied              Patient Instructions     - Follow up 1 week for Euflexxa #3 right knee   - Over the counter analgesics as needed / directed   - Ice / heat as directed     Chief Complaint     Chief Complaint   Patient presents with   • Right Knee - Follow-up         History of Present Illness       Patient is a 64-year-old female presents to the office for viscosupplementation  Euflexxa #2 right knee  Patient denies any new or worsening symptoms in her knee  Patient offers no other complaints at this time        Current Medications       Current Outpatient Medications:   •  diclofenac sodium (VOLTAREN) 1 %, Apply 2 g topically 4 (four) times a day, Disp: 1 Tube, Rfl: 1  •  latanoprost (XALATAN) 0 005 % ophthalmic solution, , Disp: , Rfl:   •  Naproxen Sodium (ALEVE PO), Take by mouth, Disp: , Rfl:     Current Allergies     Allergies as of 03/09/2023   • (No Known Allergies)            The following portions of the patient's history were reviewed and updated as appropriate: allergies, current medications, past family history, past medical history, past social history, past surgical history and problem list      Past Medical History:   Diagnosis Date   • Primary osteoarthritis of right knee 2/16/2023   • Seasonal allergies        Past Surgical History:   Procedure Laterality Date   • JOINT REPLACEMENT Left     knee       Family History   Problem Relation Age of Onset   • Diabetes Mother    • Heart disease Mother    • Cancer Mother    • Hypertension Father          Medications have been verified  Objective   /72   Pulse 69   Temp 98 1 °F (36 7 °C)   Ht 5' 3 75" (1 619 m)   Wt 103 kg (226 lb)   BMI 39 10 kg/m²   No LMP recorded

## 2023-03-23 ENCOUNTER — PROCEDURE VISIT (OUTPATIENT)
Dept: OBGYN CLINIC | Facility: CLINIC | Age: 63
End: 2023-03-23

## 2023-03-23 VITALS
BODY MASS INDEX: 38.58 KG/M2 | TEMPERATURE: 98.2 F | WEIGHT: 226 LBS | HEART RATE: 71 BPM | HEIGHT: 64 IN | SYSTOLIC BLOOD PRESSURE: 128 MMHG | DIASTOLIC BLOOD PRESSURE: 76 MMHG

## 2023-03-23 DIAGNOSIS — M17.11 PRIMARY OSTEOARTHRITIS OF RIGHT KNEE: Primary | ICD-10-CM

## 2023-03-23 RX ORDER — HYALURONATE SODIUM 10 MG/ML
20 SYRINGE (ML) INTRAARTICULAR
Status: COMPLETED | OUTPATIENT
Start: 2023-03-23 | End: 2023-03-23

## 2023-03-23 RX ADMIN — Medication 20 MG: at 13:10

## 2023-03-23 NOTE — PROGRESS NOTES
[facility]      NAME: Jani Rey is a 65 y o  female  : 1960    MRN: [id number]  DATE: 2023  TIME: 1:10 PM    Assessment and Plan   No primary diagnosis found  No diagnosis found  Large joint arthrocentesis: R knee  Universal Protocol:  Consent: Verbal consent obtained  Risks and benefits: risks, benefits and alternatives were discussed  Consent given by: patient  Patient understanding: patient states understanding of the procedure being performed  Patient consent: the patient's understanding of the procedure matches consent given  Site marked: the operative site was marked  Patient identity confirmed: verbally with patient    Supporting Documentation  Indications: pain   Procedure Details  Location: knee - R knee  Preparation: Patient was prepped and draped in the usual sterile fashion  Needle size: 22 G  Ultrasound guidance: no  Approach: anteromedial  Medications administered: 20 mg Sodium Hyaluronate 20 MG/2ML    Patient tolerance: patient tolerated the procedure well with no immediate complications  Dressing:  Sterile dressing applied              Patient Instructions     - Follow up as needed, patient will likely also make appt for shoulder pain  - Over the counter analgesics as needed / directed   - Ice / heat as directed     Chief Complaint     Chief Complaint   Patient presents with   • Right Knee - Follow-up         History of Present Illness     Patient is a 71-year-old female presents to the office for viscosupplementation  Euflexxa #3 right knee  Patient denies any new or worsening symptoms in her knee  Patient offers no other complaints at this time      Current Medications       Current Outpatient Medications:   •  diclofenac sodium (VOLTAREN) 1 %, Apply 2 g topically 4 (four) times a day, Disp: 1 Tube, Rfl: 1  •  latanoprost (XALATAN) 0 005 % ophthalmic solution, , Disp: , Rfl:   •  Naproxen Sodium (ALEVE PO), Take by mouth, Disp: , Rfl:     Current Allergies     Allergies as of 03/23/2023   • (No Known Allergies)            The following portions of the patient's history were reviewed and updated as appropriate: allergies, current medications, past family history, past medical history, past social history, past surgical history and problem list      Past Medical History:   Diagnosis Date   • Primary osteoarthritis of right knee 2/16/2023   • Seasonal allergies        Past Surgical History:   Procedure Laterality Date   • JOINT REPLACEMENT Left     knee       Family History   Problem Relation Age of Onset   • Diabetes Mother    • Heart disease Mother    • Cancer Mother    • Hypertension Father          Medications have been verified  Objective   /76   Pulse 71   Temp 98 2 °F (36 8 °C)   Ht 5' 3 75" (1 619 m)   Wt 103 kg (226 lb)   BMI 39 10 kg/m²   No LMP recorded         Scribe Attestation    I,:  Deshawn Cannon am acting as a scribe while in the presence of the attending physician :       I,:  Angeline Kat MD personally performed the services described in this documentation    as scribed in my presence :

## 2023-03-23 NOTE — LETTER
March 23, 2023     Patient: Velma Montano  YOB: 1960  Date of Visit: 3/23/2023      To Whom it May Concern:    Francois Dalton is under my professional care  Ayaka Cardenas was seen in my office on 3/23/2023  Ayaka Cardenas may return to full duty without restriction and return to 8 hr duty as of 4/3/23  If you have any questions or concerns, please don't hesitate to call           Sincerely,          Kinjal Elizabeth MD        CC: No Recipients

## 2023-03-28 ENCOUNTER — TELEPHONE (OUTPATIENT)
Dept: OBGYN CLINIC | Facility: CLINIC | Age: 63
End: 2023-03-28

## 2023-03-28 NOTE — TELEPHONE ENCOUNTER
qi waldron cpmtact office regarding [employer] form  This is completed and ready for , did the patient want us to fax the form to the number on the page [phone]?

## 2023-07-27 ENCOUNTER — OFFICE VISIT (OUTPATIENT)
Dept: FAMILY MEDICINE CLINIC | Facility: CLINIC | Age: 63
End: 2023-07-27
Payer: COMMERCIAL

## 2023-07-27 VITALS
OXYGEN SATURATION: 96 % | DIASTOLIC BLOOD PRESSURE: 80 MMHG | WEIGHT: 225 LBS | BODY MASS INDEX: 38.41 KG/M2 | TEMPERATURE: 98.5 F | HEART RATE: 96 BPM | RESPIRATION RATE: 16 BRPM | SYSTOLIC BLOOD PRESSURE: 126 MMHG | HEIGHT: 64 IN

## 2023-07-27 DIAGNOSIS — M25.561 ACUTE PAIN OF RIGHT KNEE: ICD-10-CM

## 2023-07-27 DIAGNOSIS — Z12.12 ENCOUNTER FOR COLORECTAL CANCER SCREENING: ICD-10-CM

## 2023-07-27 DIAGNOSIS — H93.13 TINNITUS OF BOTH EARS: Primary | ICD-10-CM

## 2023-07-27 DIAGNOSIS — Z13.1 SCREENING FOR DIABETES MELLITUS: ICD-10-CM

## 2023-07-27 DIAGNOSIS — Z13.29 SCREENING FOR THYROID DISORDER: ICD-10-CM

## 2023-07-27 DIAGNOSIS — J45.909 MILD ASTHMA WITHOUT COMPLICATION, UNSPECIFIED WHETHER PERSISTENT: ICD-10-CM

## 2023-07-27 DIAGNOSIS — Z12.11 ENCOUNTER FOR COLORECTAL CANCER SCREENING: ICD-10-CM

## 2023-07-27 DIAGNOSIS — Z13.0 SCREENING FOR DEFICIENCY ANEMIA: ICD-10-CM

## 2023-07-27 DIAGNOSIS — Z13.220 SCREENING CHOLESTEROL LEVEL: ICD-10-CM

## 2023-07-27 DIAGNOSIS — Z12.31 ENCOUNTER FOR SCREENING MAMMOGRAM FOR BREAST CANCER: ICD-10-CM

## 2023-07-27 PROCEDURE — 99204 OFFICE O/P NEW MOD 45 MIN: CPT | Performed by: FAMILY MEDICINE

## 2023-07-27 RX ORDER — ALBUTEROL SULFATE 90 UG/1
2 AEROSOL, METERED RESPIRATORY (INHALATION) EVERY 6 HOURS PRN
Qty: 6.7 G | Refills: 5 | Status: SHIPPED | OUTPATIENT
Start: 2023-07-27

## 2023-07-27 NOTE — PROGRESS NOTES
Jeff Bush 1960 female MRN: [id number]    [address]      ASSESSMENT/PLAN  Jeff Bush is a 65 y.o. female presents to the office for    Diagnoses and all orders for this visit:    Tinnitus of both ears  Comments:  Referral was given to ENT. Education given on sources that could be causing the tinnitus. Also recommend audiology consult  Orders:  -     Ambulatory Referral to Otolaryngology; Future  -     Ambulatory Referral to Audiology; Future    Encounter for screening mammogram for breast cancer  -     Mammo screening bilateral w 3d & cad; Future    Encounter for colorectal cancer screening  -     Cologuard    Acute pain of right knee  Comments:  Recommend continuing recommendations of orthopedic    Screening for thyroid disorder  -     TSH, 3rd generation with Free T4 reflex; Future    Screening for diabetes mellitus  -     Comprehensive metabolic panel; Future    Screening cholesterol level  -     Lipid panel; Future    Screening for deficiency anemia  -     CBC and differential; Future    Mild asthma without complication, unspecified whether persistent  Comments:  Recommend a formal pulmonary function test.  If asthma is not present recommend a cardiac work-up  Orders:  -     Complete PFT with post bronchodilator; Future  -     albuterol (Proventil HFA) 90 mcg/act inhaler; Inhale 2 puffs every 6 (six) hours as needed for wheezing    Will see the patient back for her annual physical and to review labs         No future appointments. SUBJECTIVE  CC: Tinnitus      HPI:  Jeff Bush is a 65 y.o. female who presents for an establish care appointment. Patient's  is currently going her practice. Patient has had multiple knee concerns in the past and is being seen by orthopedics. She had a knee replacement in 2016 and a meniscus repair in her right knee.   Patient states that she has ongoing tinnitus for time has denied any recent infections. Does admit to loud noises and listening to music loud. Does use Aleve a lot for her knee injuries. Patient does have a history of asthma that was confirmed by pulmonary function test in the past.  Patient states that she gets short of breath when it is humid outside and the inhaler helps her and would like a refill if possible      Review of Systems   Constitutional: Negative for activity change, appetite change, chills, fatigue and fever. HENT: Negative for congestion. Respiratory: Negative for cough, chest tightness and shortness of breath. Cardiovascular: Negative for chest pain and leg swelling. Gastrointestinal: Negative for abdominal distention, abdominal pain, constipation, diarrhea, nausea and vomiting. All other systems reviewed and are negative. Historical Information   The patient history was reviewed as follows:  Past Medical History:   Diagnosis Date   • Primary osteoarthritis of right knee 2/16/2023   • Seasonal allergies          Medications:     Current Outpatient Medications:   •  albuterol (Proventil HFA) 90 mcg/act inhaler, Inhale 2 puffs every 6 (six) hours as needed for wheezing, Disp: 6.7 g, Rfl: 5  •  diclofenac sodium (VOLTAREN) 1 %, Apply 2 g topically 4 (four) times a day, Disp: 1 Tube, Rfl: 1  •  latanoprost (XALATAN) 0.005 % ophthalmic solution, , Disp: , Rfl:   •  Naproxen Sodium (ALEVE PO), Take by mouth, Disp: , Rfl:     No Known Allergies    OBJECTIVE  Vitals:   Vitals:    07/27/23 1515   BP: 126/80   BP Location: Left arm   Patient Position: Sitting   Cuff Size: Large   Pulse: 96   Resp: 16   Temp: 98.5 °F (36.9 °C)   SpO2: 96%   Weight: 102 kg (225 lb)   Height: 5' 3.75" (1.619 m)         Physical Exam  Vitals reviewed. Constitutional:       Appearance: She is well-developed. HENT:      Head: Normocephalic and atraumatic.    Eyes:      Conjunctiva/sclera: Conjunctivae normal.      Pupils: Pupils are equal, round, and reactive to light. Cardiovascular:      Rate and Rhythm: Normal rate and regular rhythm. Heart sounds: Normal heart sounds. Pulmonary:      Effort: Pulmonary effort is normal. No respiratory distress. Breath sounds: Normal breath sounds. Musculoskeletal:         General: Normal range of motion. Cervical back: Normal range of motion and neck supple. Skin:     General: Skin is warm. Capillary Refill: Capillary refill takes less than 2 seconds. Neurological:      Mental Status: She is alert and oriented to person, place, and time.                     Lee Rivers MD,   [facility]  7/28/2023

## 2023-08-16 ENCOUNTER — HOSPITAL ENCOUNTER (OUTPATIENT)
Dept: PULMONOLOGY | Facility: HOSPITAL | Age: 63
Discharge: HOME | End: 2023-08-16
Attending: FAMILY MEDICINE
Payer: COMMERCIAL

## 2023-08-16 DIAGNOSIS — J45.909 MILD ASTHMA WITHOUT COMPLICATION, UNSPECIFIED WHETHER PERSISTENT: ICD-10-CM

## 2023-08-16 PROCEDURE — 94729 DIFFUSING CAPACITY: CPT | Performed by: INTERNAL MEDICINE

## 2023-08-16 PROCEDURE — 94726 PLETHYSMOGRAPHY LUNG VOLUMES: CPT | Performed by: INTERNAL MEDICINE

## 2023-08-16 PROCEDURE — 94726 PLETHYSMOGRAPHY LUNG VOLUMES: CPT

## 2023-08-16 PROCEDURE — 94760 N-INVAS EAR/PLS OXIMETRY 1: CPT

## 2023-08-16 PROCEDURE — 94060 EVALUATION OF WHEEZING: CPT

## 2023-08-16 PROCEDURE — 94060 EVALUATION OF WHEEZING: CPT | Performed by: INTERNAL MEDICINE

## 2023-08-16 PROCEDURE — 94729 DIFFUSING CAPACITY: CPT

## 2023-08-16 RX ORDER — ALBUTEROL SULFATE 2.5 MG/3ML
2.5 SOLUTION RESPIRATORY (INHALATION) ONCE
Status: COMPLETED | OUTPATIENT
Start: 2023-08-16 | End: 2023-08-16

## 2023-08-16 RX ADMIN — ALBUTEROL SULFATE 2.5 MG: 2.5 SOLUTION RESPIRATORY (INHALATION) at 15:21

## 2023-08-22 DIAGNOSIS — J45.20 MILD INTERMITTENT ASTHMA WITHOUT COMPLICATION: ICD-10-CM

## 2023-08-22 DIAGNOSIS — H93.13 TINNITUS OF BOTH EARS: Primary | ICD-10-CM

## 2023-08-22 RX ORDER — FLUTICASONE PROPIONATE 110 UG/1
2 AEROSOL, METERED RESPIRATORY (INHALATION) 2 TIMES DAILY
Qty: 12 G | Refills: 0 | Status: SHIPPED | OUTPATIENT
Start: 2023-08-22

## 2023-08-30 ENCOUNTER — TELEPHONE (OUTPATIENT)
Dept: FAMILY MEDICINE CLINIC | Facility: CLINIC | Age: 63
End: 2023-08-30

## 2023-08-30 DIAGNOSIS — Z12.11 ENCOUNTER FOR COLORECTAL CANCER SCREENING: Primary | ICD-10-CM

## 2023-08-30 DIAGNOSIS — Z12.12 ENCOUNTER FOR COLORECTAL CANCER SCREENING: Primary | ICD-10-CM

## 2023-08-30 NOTE — TELEPHONE ENCOUNTER
Patient tried and is unable to do stool sample for cologuard, would like you to send order for colonoscopy.

## 2023-10-16 ENCOUNTER — OFFICE VISIT (OUTPATIENT)
Dept: OTOLARYNGOLOGY | Facility: CLINIC | Age: 63
End: 2023-10-16
Payer: COMMERCIAL

## 2023-10-16 ENCOUNTER — OFFICE VISIT (OUTPATIENT)
Dept: AUDIOLOGY | Facility: CLINIC | Age: 63
End: 2023-10-16
Payer: COMMERCIAL

## 2023-10-16 VITALS — BODY MASS INDEX: 37.56 KG/M2 | WEIGHT: 220 LBS | HEIGHT: 64 IN | TEMPERATURE: 97.8 F

## 2023-10-16 DIAGNOSIS — H90.3 SENSORINEURAL HEARING LOSS (SNHL), BILATERAL: Primary | ICD-10-CM

## 2023-10-16 DIAGNOSIS — H93.13 TINNITUS OF BOTH EARS: Primary | ICD-10-CM

## 2023-10-16 PROCEDURE — 99204 OFFICE O/P NEW MOD 45 MIN: CPT | Performed by: NURSE PRACTITIONER

## 2023-10-16 PROCEDURE — 92567 TYMPANOMETRY: CPT | Performed by: AUDIOLOGIST

## 2023-10-16 PROCEDURE — 92557 COMPREHENSIVE HEARING TEST: CPT | Performed by: AUDIOLOGIST

## 2023-10-16 NOTE — PATIENT INSTRUCTIONS
Tinnitus and possible causes including medications, viral illness, inner ear disorder, TMJ syndrome, or hearing changes. Options for bilateral tinnitus including adding background noise, tinnitus retraining therapy, masking device, Resound tinnitus lincoln, Acupuncture, or acceptance. Limit caffeine and ibuprofen intake. Discouraged q-tip use due to damage of ear canal.  Consider Flonase daily for eustachian tube dysfunction. No specific medications or surgery indicated for treatment of tinnitus.   Consider MRI imaging if worsens

## 2023-10-16 NOTE — PROGRESS NOTES
ADULT ENT HEARING EVALUATION - [address] AUDIOLOGY      Patient Name: Marjorie Whitney   MRN:  [id number]   :  1960   Age: 59 y.o. Gender: female   DOS: 10/16/2023     HISTORY:     Marjorie Whitney, a 59 y.o. female, was seen on 10/16/2023 at the referral of YAMILE Best,  for an evaluation of hearing as part of her ENT visit. Ms. Hazel García primary complaint is tinnitus bilaterally. She denied otalgia, otorrhea, aural fullness, and dizziness. Ms. Jesús Nicole has not had her hearing tested previously. RESULTS:    Otoscopic Evaluation:   Right Ear: Unremarkable, canal clear   Left Ear: Unremarkable, canal clear    Tympanometry:   Right Ear: Type A; normal middle ear pressure and static compliance    Left Ear: Type A; normal middle ear pressure and static compliance     Audiometry:  Conventional pure tone audiometry from 250 - 8000 Hz  obtained with good reliability and revealed the following:     Right Ear: normal to mild sensorineural hearing loss (SNHL)   Left Ear: normal to mild sensorineural hearing loss (SNHL)     Speech Audiometry:    Speech Reception (SRT)   Right Ear: 15 dB HL   Left Ear: 20 dB HL    Word Recognition Scores (WRS):  Right Ear: excellent (100 % correct)     Left Ear: excellent (100 % correct)   Stimuli: [id number]    *see attached audiogram*      RECOMMENDATIONS:    1.) Follow-up with referring provider. 2.) Make hearing exams a regular check up every 1-2 years for monitoring. It was a pleasure working with Ms. Jesús Nicole today. Thank you for referring this patient. Jonathan Ibarra, LOLIS  3rd year Audiology student    [facility] [address]   Clinical Audiologist    [facility]  [address] [place] [address]  [address] [id number]

## 2023-10-16 NOTE — PROGRESS NOTES
Assessment/Plan:    Bilateral tinnitus  No significant findings on exam.  No cerumen impaction, no serous fluid. Offered audiogram although pt noted hearing is normal.  Audiogram today reviewed and interpreted and indicating bilateral mild high frequency SNHL. Word discrimination 100%. Tymps type A bilaterally. Tinnitus most bothersome during the night. Reviewed nature of tinnitus and possible causes including medications, viral illness, inner ear disorder, TMJ syndrome, or hearing changes. Based on audiogram, suspect tinnitus due to bilateral high frequency SNHL along with pt reported increased home stress. Discussed options for bilateral tinnitus including adding background noise, tinnitus retraining therapy, masking device, Resound tinnitus lincoln, Acupuncture, or acceptance. Limit caffeine and ibuprofen intake. Discouraged q-tip use due to damage of eac. Consider Flonase daily for eustachian tube dysfunction. No specific medications or surgery indicated for treatment of tinnitus. Offered imaging if worsens         Diagnoses and all orders for this visit:    Tinnitus of both ears  Comments:  Referral was given to ENT. Education given on sources that could be causing the tinnitus. Also recommend audiology consult  Orders:  -     Ambulatory Referral to Otolaryngology  -     Ambulatory referral to Audiology          Subjective:      Patient ID: Tarsha Toney is a 59 y.o. female. Presents today as a new patient due to ear concerns. Occurring for about 3 months. Hearing gradually worsening. Bilateral ringing tinnitus. No otalgia or otorrhea. No history of ear surgery. No current hearing aids. Works in noisy environment.  Reports increased stress at home            The following portions of the patient's history were reviewed and updated as appropriate: allergies, current medications, past family history, past medical history, past social history, past surgical history, and problem list.    Review of Systems   Constitutional: Negative. HENT:  Positive for tinnitus. Negative for congestion, ear discharge, ear pain, hearing loss, nosebleeds, postnasal drip, rhinorrhea, sinus pressure, sinus pain, sore throat and voice change. Respiratory:  Negative for chest tightness and shortness of breath. Skin:  Negative for color change. Neurological:  Negative for dizziness, numbness and headaches. Psychiatric/Behavioral: Negative. Objective:      Temp 97.8 °F (36.6 °C) (Temporal)   Ht 5' 3.75" (1.619 m)   Wt 99.8 kg (220 lb)   BMI 38.06 kg/m²          Physical Exam  Constitutional:       Appearance: She is well-developed. HENT:      Head: Normocephalic. Right Ear: Hearing, tympanic membrane, ear canal and external ear normal. No decreased hearing noted. No drainage or tenderness. Tympanic membrane is not perforated or erythematous. Left Ear: Hearing, tympanic membrane, ear canal and external ear normal. No decreased hearing noted. No drainage or tenderness. Tympanic membrane is not perforated or erythematous. Nose: Nose normal. No nasal deformity or septal deviation. Mouth/Throat:      Mouth: Mucous membranes are not pale and not dry. No oral lesions. Dentition: Normal dentition. Pharynx: Uvula midline. No oropharyngeal exudate. Neck:      Trachea: No tracheal deviation. Pulmonary:      Effort: Pulmonary effort is normal. No accessory muscle usage or respiratory distress. Musculoskeletal:      Cervical back: Full passive range of motion without pain and neck supple. Lymphadenopathy:      Cervical: No cervical adenopathy. Skin:     General: Skin is warm and dry. Neurological:      Mental Status: She is alert and oriented to person, place, and time. Cranial Nerves: No cranial nerve deficit. Sensory: No sensory deficit. Psychiatric:         Behavior: Behavior is cooperative.

## 2023-10-16 NOTE — ASSESSMENT & PLAN NOTE
No significant findings on exam.  No cerumen impaction, no serous fluid. Offered audiogram although pt noted hearing is normal.  Audiogram today reviewed and interpreted and indicating bilateral mild high frequency SNHL. Word discrimination 100%. Tymps type A bilaterally. Tinnitus most bothersome during the night. Reviewed nature of tinnitus and possible causes including medications, viral illness, inner ear disorder, TMJ syndrome, or hearing changes. Based on audiogram, suspect tinnitus due to bilateral high frequency SNHL along with pt reported increased home stress. Discussed options for bilateral tinnitus including adding background noise, tinnitus retraining therapy, masking device, Resound tinnitus lincoln, Acupuncture, or acceptance. Limit caffeine and ibuprofen intake. Discouraged q-tip use due to damage of eac. Consider Flonase daily for eustachian tube dysfunction. No specific medications or surgery indicated for treatment of tinnitus.   Offered imaging if worsens

## 2023-11-14 ENCOUNTER — PREP FOR PROCEDURE (OUTPATIENT)
Age: 63
End: 2023-11-14

## 2023-11-14 ENCOUNTER — TELEPHONE (OUTPATIENT)
Age: 63
End: 2023-11-14

## 2023-11-14 DIAGNOSIS — Z12.11 SCREENING FOR COLON CANCER: Primary | ICD-10-CM

## 2023-11-14 NOTE — TELEPHONE ENCOUNTER
Scheduled date of colonoscopy (as of today):12/15/23    Physician performing colonoscopy:Dr Aguayo    Location of colonoscopy:[facility]    Bowel prep reviewed with patient:miralax/dulcolax    Instructions reviewed with patient by:prep instructions sent via email     Clearances: N/A

## 2023-11-14 NOTE — TELEPHONE ENCOUNTER
11/14/23  Screened by: Beverley Sweeney    Referring Provider     Pre- Screening: There is no height or weight on file to calculate BMI. Has patient been referred for a routine screening Colonoscopy? yes  Is the patient between 43-73 years old? yes      Previous Colonoscopy no   If yes:    Date:     Facility:     Reason:       SCHEDULING STAFF: If the patient is between 45yrs-49yrs, please advise patient to confirm benefits/coverage with their insurance company for a routine screening colonoscopy, some insurance carriers will only cover at [address] [place] [address] or older. If the patient is over 66years old, please schedule an office visit. Does the patient want to see a Gastroenterologist prior to their procedure OR are they having any GI symptoms? no    Has the patient been hospitalized or had abdominal surgery in the past 6 months? no    Does the patient use supplemental oxygen? no    Does the patient take Coumadin, Lovenox, Plavix, Elliquis, Xarelto, or other blood thinning medication? no    Has the patient had a stroke, cardiac event, or stent placed in the past year? no    SCHEDULING STAFF: If patient answers NO to above questions, then schedule procedure. If patient answers YES to above questions, then schedule office appointment. Pt passed OA    If patient is between 45yrs - 49yrs, please advise patient that we will have to confirm benefits & coverage with their insurance company for a routine screening colonoscopy.

## 2023-11-21 DIAGNOSIS — J45.20 MILD INTERMITTENT ASTHMA WITHOUT COMPLICATION: ICD-10-CM

## 2023-11-21 RX ORDER — DEXAMETHASONE 4 MG/1
TABLET ORAL
Qty: 12 G | Refills: 1 | Status: SHIPPED | OUTPATIENT
Start: 2023-11-21

## 2023-11-30 ENCOUNTER — HOSPITAL ENCOUNTER (OUTPATIENT)
Dept: RADIOLOGY | Facility: HOSPITAL | Age: 63
Discharge: HOME | End: 2023-11-30
Attending: FAMILY MEDICINE
Payer: COMMERCIAL

## 2023-11-30 VITALS — BODY MASS INDEX: 37.56 KG/M2 | HEIGHT: 64 IN | WEIGHT: 220 LBS

## 2023-11-30 DIAGNOSIS — Z12.31 ENCOUNTER FOR SCREENING MAMMOGRAM FOR BREAST CANCER: ICD-10-CM

## 2023-11-30 PROCEDURE — 77067 SCR MAMMO BI INCL CAD: CPT

## 2023-11-30 PROCEDURE — 77063 BREAST TOMOSYNTHESIS BI: CPT

## 2023-12-01 ENCOUNTER — ANESTHESIA EVENT (OUTPATIENT)
Dept: ANESTHESIOLOGY | Facility: HOSPITAL | Age: 63
End: 2023-12-01

## 2023-12-01 ENCOUNTER — TELEPHONE (OUTPATIENT)
Dept: FAMILY MEDICINE CLINIC | Facility: CLINIC | Age: 63
End: 2023-12-01

## 2023-12-01 ENCOUNTER — ANESTHESIA (OUTPATIENT)
Dept: ANESTHESIOLOGY | Facility: HOSPITAL | Age: 63
End: 2023-12-01

## 2023-12-01 NOTE — TELEPHONE ENCOUNTER
----- Message from Bob Hartman MD sent at 12/1/2023  9:00 AM EST -----  No mammographic evidence of malignancy; Repeat in 1 year

## 2023-12-06 ENCOUNTER — TELEPHONE (OUTPATIENT)
Age: 63
End: 2023-12-06

## 2023-12-06 NOTE — TELEPHONE ENCOUNTER
Pt called in stating she has a colonoscopy coming up and she doesn't have any instructions. I asked her to contact her GI dr for instructions. She understood.

## 2023-12-11 ENCOUNTER — TELEPHONE (OUTPATIENT)
Age: 63
End: 2023-12-11

## 2023-12-11 NOTE — TELEPHONE ENCOUNTER
Patients GI provider:  Dr. Ana Hooper    Number to return call: [phone]    Reason for call: Pt called stating that she did not receive her prep instructions. Sent TAMI/DUL prep istructions to [email or web address]    Scheduled procedure/appointment date if applicable: procedure 00/64/26

## 2023-12-15 ENCOUNTER — ANESTHESIA EVENT (OUTPATIENT)
Dept: GASTROENTEROLOGY | Facility: AMBULARY SURGERY CENTER | Age: 63
End: 2023-12-15

## 2023-12-15 ENCOUNTER — ANESTHESIA (OUTPATIENT)
Dept: GASTROENTEROLOGY | Facility: AMBULARY SURGERY CENTER | Age: 63
End: 2023-12-15

## 2023-12-15 ENCOUNTER — HOSPITAL ENCOUNTER (OUTPATIENT)
Dept: GASTROENTEROLOGY | Facility: AMBULARY SURGERY CENTER | Age: 63
Setting detail: OUTPATIENT SURGERY
End: 2023-12-15
Attending: INTERNAL MEDICINE
Payer: COMMERCIAL

## 2023-12-15 VITALS
RESPIRATION RATE: 18 BRPM | TEMPERATURE: 97.3 F | OXYGEN SATURATION: 98 % | SYSTOLIC BLOOD PRESSURE: 134 MMHG | HEART RATE: 76 BPM | DIASTOLIC BLOOD PRESSURE: 79 MMHG

## 2023-12-15 DIAGNOSIS — Z12.11 SCREENING FOR COLON CANCER: ICD-10-CM

## 2023-12-15 PROCEDURE — G0121 COLON CA SCRN NOT HI RSK IND: HCPCS | Performed by: INTERNAL MEDICINE

## 2023-12-15 RX ORDER — PROPOFOL 10 MG/ML
INJECTION, EMULSION INTRAVENOUS AS NEEDED
Status: DISCONTINUED | OUTPATIENT
Start: 2023-12-15 | End: 2023-12-15

## 2023-12-15 RX ORDER — SODIUM CHLORIDE, SODIUM LACTATE, POTASSIUM CHLORIDE, CALCIUM CHLORIDE 600; 310; 30; 20 MG/100ML; MG/100ML; MG/100ML; MG/100ML
125 INJECTION, SOLUTION INTRAVENOUS CONTINUOUS
Status: DISCONTINUED | OUTPATIENT
Start: 2023-12-15 | End: 2023-12-19 | Stop reason: HOSPADM

## 2023-12-15 RX ORDER — LIDOCAINE HYDROCHLORIDE 10 MG/ML
INJECTION, SOLUTION EPIDURAL; INFILTRATION; INTRACAUDAL; PERINEURAL AS NEEDED
Status: DISCONTINUED | OUTPATIENT
Start: 2023-12-15 | End: 2023-12-15

## 2023-12-15 RX ORDER — SODIUM CHLORIDE, SODIUM LACTATE, POTASSIUM CHLORIDE, CALCIUM CHLORIDE 600; 310; 30; 20 MG/100ML; MG/100ML; MG/100ML; MG/100ML
125 INJECTION, SOLUTION INTRAVENOUS CONTINUOUS
Status: CANCELLED | OUTPATIENT
Start: 2023-12-15

## 2023-12-15 RX ADMIN — LIDOCAINE HYDROCHLORIDE 50 MG: 10 INJECTION, SOLUTION EPIDURAL; INFILTRATION; INTRACAUDAL; PERINEURAL at 10:01

## 2023-12-15 RX ADMIN — SODIUM CHLORIDE, SODIUM LACTATE, POTASSIUM CHLORIDE, AND CALCIUM CHLORIDE: .6; .31; .03; .02 INJECTION, SOLUTION INTRAVENOUS at 09:45

## 2023-12-15 RX ADMIN — PROPOFOL 100 MG: 10 INJECTION, EMULSION INTRAVENOUS at 10:02

## 2023-12-15 RX ADMIN — PROPOFOL 50 MG: 10 INJECTION, EMULSION INTRAVENOUS at 10:04

## 2023-12-15 RX ADMIN — PROPOFOL 50 MG: 10 INJECTION, EMULSION INTRAVENOUS at 10:06

## 2023-12-15 NOTE — H&P
History and Physical -  [facility]  Renny Wild 59 y.o. female MRN: [id number]        HPI: 60-year-old female was referred for screening colonoscopy. Regular bowel movements. Historical Information   Past Medical History:   Diagnosis Date    Mild asthma without complication     Primary osteoarthritis of right knee 02/16/2023    Seasonal allergies      Past Surgical History:   Procedure Laterality Date    JOINT REPLACEMENT Left     knee     Social History   Social History     Substance and Sexual Activity   Alcohol Use Yes     Social History     Substance and Sexual Activity   Drug Use Not Currently     Social History     Tobacco Use   Smoking Status Never   Smokeless Tobacco Never     Family History   Problem Relation Age of Onset    Melanoma Mother     Diabetes Mother     Heart disease Mother     Cancer Mother     Hypertension Father     No Known Problems Sister     No Known Problems Sister     No Known Problems Sister     No Known Problems Sister     No Known Problems Sister     Colon cancer Paternal Aunt     Melanoma Paternal Uncle     Breast cancer Maternal Aunt 48    No Known Problems Brother        Meds/Allergies     (Not in a hospital admission)      No Known Allergies    Objective     Blood pressure 160/71, pulse 75, temperature (!) 97.3 °F (36.3 °C), temperature source Temporal, resp. rate 18, SpO2 98%.     PHYSICAL EXAM:    Gen: NAD  CV: S1 & S2 normal, RRR  CHEST: Clear to auscultate  ABD: soft, NT/ND, good bowel sounds  EXT: no edema    ASSESSMENT:     Screening for colon cancer     PLAN:    Colonoscopy

## 2023-12-15 NOTE — ANESTHESIA POSTPROCEDURE EVALUATION
Post-Op Assessment Note    CV Status:  Stable  Pain Score: 0    Pain management: adequate       Mental Status:  Alert and awake   Hydration Status:  Euvolemic   PONV Controlled:  Controlled   Airway Patency:  Patent     Post Op Vitals Reviewed: Yes      Staff: Anesthesiologist               BP   111/71   Temp      Pulse  82   Resp   15   SpO2   97

## 2023-12-15 NOTE — ANESTHESIA PREPROCEDURE EVALUATION
Procedure:  COLONOSCOPY    Relevant Problems   PULMONARY   (+) Mild asthma without complication        Physical Exam    Airway    Mallampati score: II  TM Distance: >3 FB  Neck ROM: full     Dental   Comment: Denies loose teeth     Cardiovascular  Cardiovascular exam normal    Pulmonary  Pulmonary exam normal     Other Findings  Portions of exam deferred due to low yield and/or unknown COVID statuspost-pubertal.      Anesthesia Plan  ASA Score- 2     Anesthesia Type- IV sedation with anesthesia with ASA Monitors. Additional Monitors:     Airway Plan:            Plan Factors-Exercise tolerance (METS): >4 METS. Chart reviewed. Existing labs reviewed. Patient summary reviewed. Patient is not a current smoker. Induction- intravenous. Postoperative Plan-     Informed Consent- Anesthetic plan and risks discussed with patient. I personally reviewed this patient with the CRNA. Discussed and agreed on the Anesthesia Plan with the CRNA. Lilliam Rincon

## 2023-12-20 ENCOUNTER — TELEPHONE (OUTPATIENT)
Age: 63
End: 2023-12-20

## 2023-12-20 NOTE — TELEPHONE ENCOUNTER
Pt called to say she will be picking up the [facility] papers today in regards to her . I was already in the pts chart when she told me they were for her . Message sent in his chart.

## 2024-07-29 ENCOUNTER — OFFICE VISIT (OUTPATIENT)
Dept: FAMILY MEDICINE CLINIC | Facility: CLINIC | Age: 64
End: 2024-07-29
Payer: COMMERCIAL

## 2024-07-29 VITALS
HEIGHT: 64 IN | TEMPERATURE: 98 F | HEART RATE: 78 BPM | DIASTOLIC BLOOD PRESSURE: 90 MMHG | WEIGHT: 214 LBS | SYSTOLIC BLOOD PRESSURE: 150 MMHG | RESPIRATION RATE: 14 BRPM | BODY MASS INDEX: 36.54 KG/M2 | OXYGEN SATURATION: 96 %

## 2024-07-29 DIAGNOSIS — F41.8 SITUATIONAL ANXIETY: ICD-10-CM

## 2024-07-29 DIAGNOSIS — T63.444S BEE STING REACTION, UNDETERMINED INTENT, SEQUELA: Primary | ICD-10-CM

## 2024-07-29 DIAGNOSIS — R03.0 PREHYPERTENSION: ICD-10-CM

## 2024-07-29 PROCEDURE — 3725F SCREEN DEPRESSION PERFORMED: CPT | Performed by: FAMILY MEDICINE

## 2024-07-29 PROCEDURE — 99214 OFFICE O/P EST MOD 30 MIN: CPT | Performed by: FAMILY MEDICINE

## 2024-07-29 RX ORDER — CEPHALEXIN 500 MG/1
500 CAPSULE ORAL EVERY 12 HOURS SCHEDULED
Qty: 14 CAPSULE | Refills: 0 | Status: SHIPPED | OUTPATIENT
Start: 2024-07-29 | End: 2024-08-05

## 2024-07-29 RX ORDER — ALPRAZOLAM 0.25 MG/1
0.25 TABLET ORAL
Qty: 15 TABLET | Refills: 0 | Status: SHIPPED | OUTPATIENT
Start: 2024-07-29

## 2024-07-29 RX ORDER — METHYLPREDNISOLONE 4 MG/1
TABLET ORAL
Qty: 21 EACH | Refills: 0 | Status: SHIPPED | OUTPATIENT
Start: 2024-07-29

## 2024-07-29 NOTE — PROGRESS NOTES
Kinjal Earl 1960 female MRN: [id number]    [facility] OFFICE VISIT  [facility]      ASSESSMENT/PLAN  Kinjal Earl is a 63 y.o. female presents to the office for    Diagnoses and all orders for this visit:    Bee sting reaction, undetermined intent, sequela  -     methylPREDNISolone 4 MG tablet therapy pack; Use as directed on package  -     cephalexin (KEFLEX) 500 mg capsule; Take 1 capsule (500 mg total) by mouth every 12 (twelve) hours for 7 days    Prehypertension    Situational anxiety  -     ALPRAZolam (XANAX) 0.25 mg tablet; Take 1 tablet (0.25 mg total) by mouth daily at bedtime as needed for anxiety       Bee sting treatment shown above if no improvement to please notify our office  Monitor BP I do believe that the patient will likely benefit from blood pressure medication if it continues to be elevated at home at home  Situational anxiety sent in Xanax to be used.  Half a tablet as needed.  Did recommend patient to reach out to us.  I do believe she would likely will need to benefit from a low-dose Lexapro           Future Appointments   Date Time Provider Department Center   8/19/2024  2:30 PM Ambar Ott MD [facility]          SUBJECTIVE  CC: Bee Sting (Right hand and left foot red/swollen)      HPI:  Kinjal Earl is a 63 y.o. female who presents for an acute appointment.  Right hand and left foot swelling after seeing a bee.  States her right wrist is worse.  Patient states that she has never had high blood pressure.  She is very emotional today given that she feels like her anxiety has not been doing well since she has been caring for her  who has been very ill.  She does admit to taking half a dose of Xanax that did help her.  Patient states that it is just situational at this time  Review of Systems   Constitutional:  Negative for activity change, appetite change, chills, fatigue and fever.   HENT:   "Negative for congestion.    Respiratory:  Negative for cough, chest tightness and shortness of breath.    Cardiovascular:  Negative for chest pain and leg swelling.   Gastrointestinal:  Negative for abdominal distention, abdominal pain, constipation, diarrhea, nausea and vomiting.   Musculoskeletal:  Positive for joint swelling.   All other systems reviewed and are negative.      Historical Information   The patient history was reviewed as follows:  Past Medical History:   Diagnosis Date   • Mild asthma without complication    • Primary osteoarthritis of right knee 02/16/2023   • Seasonal allergies          Medications:     Current Outpatient Medications:   •  albuterol (Proventil HFA) 90 mcg/act inhaler, Inhale 2 puffs every 6 (six) hours as needed for wheezing, Disp: 6.7 g, Rfl: 5  •  ALPRAZolam (XANAX) 0.25 mg tablet, Take 1 tablet (0.25 mg total) by mouth daily at bedtime as needed for anxiety, Disp: 15 tablet, Rfl: 0  •  cephalexin (KEFLEX) 500 mg capsule, Take 1 capsule (500 mg total) by mouth every 12 (twelve) hours for 7 days, Disp: 14 capsule, Rfl: 0  •  diclofenac sodium (VOLTAREN) 1 %, Apply 2 g topically 4 (four) times a day, Disp: 1 Tube, Rfl: 1  •  fluticasone (Flovent HFA) 110 MCG/ACT inhaler, INHALE TWO PUFFS BY MOUTH TWICE A DAY RINSE MOUTH AFTER USE, Disp: 12 g, Rfl: 1  •  latanoprost (XALATAN) 0.005 % ophthalmic solution, , Disp: , Rfl:   •  methylPREDNISolone 4 MG tablet therapy pack, Use as directed on package, Disp: 21 each, Rfl: 0  •  Naproxen Sodium (ALEVE PO), Take by mouth, Disp: , Rfl:     No Known Allergies    OBJECTIVE  Vitals:   Vitals:    07/29/24 1210   BP: 150/90   BP Location: Left arm   Patient Position: Sitting   Cuff Size: Adult   Pulse: 78   Resp: 14   Temp: 98 °F (36.7 °C)   TempSrc: Temporal   SpO2: 96%   Weight: 97.1 kg (214 lb)   Height: 5' 4\" (1.626 m)         Physical Exam  Vitals reviewed.   Constitutional:       Appearance: She is well-developed.   HENT:      Head: " Normocephalic and atraumatic.   Eyes:      Extraocular Movements: EOM normal.      Conjunctiva/sclera: Conjunctivae normal.      Pupils: Pupils are equal, round, and reactive to light.   Cardiovascular:      Rate and Rhythm: Normal rate and regular rhythm.      Heart sounds: Normal heart sounds, S1 normal and S2 normal. No murmur heard.  Pulmonary:      Effort: Pulmonary effort is normal. No respiratory distress.      Breath sounds: Normal breath sounds. No wheezing.   Musculoskeletal:         General: No edema. Normal range of motion.      Cervical back: Normal range of motion and neck supple.   Skin:     General: Skin is warm.      Comments: Right wrist swelling and erythema   Neurological:      Mental Status: She is alert and oriented to person, place, and time.   Psychiatric:         Mood and Affect: Mood and affect normal.         Speech: Speech normal.         Behavior: Behavior normal.         Thought Content: Thought content normal.         Judgment: Judgment normal.                    Ambar Odom MD,   [facility] [place]  7/29/2024

## 2024-08-16 ENCOUNTER — TELEPHONE (OUTPATIENT)
Age: 64
End: 2024-08-16

## 2024-08-16 NOTE — TELEPHONE ENCOUNTER
Patient calling to confirm her appt for MONDAY 8/19 -  she wanted to ask Dr. Odom if it would be ok if her  came with her - he was seen in the ER last Friday (nothing is wrong with him he is fine) and they rec a follow up. Please review if can accommodate both at this time

## 2024-08-19 ENCOUNTER — OFFICE VISIT (OUTPATIENT)
Dept: FAMILY MEDICINE CLINIC | Facility: CLINIC | Age: 64
End: 2024-08-19
Payer: COMMERCIAL

## 2024-08-19 VITALS
HEIGHT: 64 IN | OXYGEN SATURATION: 95 % | SYSTOLIC BLOOD PRESSURE: 154 MMHG | WEIGHT: 213 LBS | RESPIRATION RATE: 18 BRPM | TEMPERATURE: 98 F | BODY MASS INDEX: 36.37 KG/M2 | DIASTOLIC BLOOD PRESSURE: 84 MMHG | HEART RATE: 91 BPM

## 2024-08-19 DIAGNOSIS — Z00.00 PHYSICAL EXAM: Primary | ICD-10-CM

## 2024-08-19 DIAGNOSIS — F41.8 SITUATIONAL ANXIETY: ICD-10-CM

## 2024-08-19 DIAGNOSIS — Z13.29 SCREENING FOR THYROID DISORDER: ICD-10-CM

## 2024-08-19 DIAGNOSIS — E78.5 HYPERLIPIDEMIA, UNSPECIFIED HYPERLIPIDEMIA TYPE: ICD-10-CM

## 2024-08-19 DIAGNOSIS — I15.9 SECONDARY HYPERTENSION: ICD-10-CM

## 2024-08-19 PROCEDURE — 99396 PREV VISIT EST AGE 40-64: CPT | Performed by: FAMILY MEDICINE

## 2024-08-19 RX ORDER — AMLODIPINE BESYLATE 5 MG/1
5 TABLET ORAL DAILY
Qty: 30 TABLET | Refills: 0 | Status: SHIPPED | OUTPATIENT
Start: 2024-08-19

## 2024-08-19 NOTE — PROGRESS NOTES
Adult Annual Physical  Name: Kinjal Earl      : 1960      MRN: [id number]  Encounter Provider: Ambar Odom MD  Encounter Date: 2024   Encounter department: [facility]    Assessment & Plan   1. Physical exam  2. Situational anxiety  3. Secondary hypertension  -     amLODIPine (NORVASC) 5 mg tablet; Take 1 tablet (5 mg total) by mouth daily  -     CBC and differential; Future  -     Comprehensive metabolic panel; Future  4. Hyperlipidemia, unspecified hyperlipidemia type  -     Lipid panel; Future  5. Screening for thyroid disorder  -     TSH, 3rd generation with Free T4 reflex; Future  Reviewed blood pressure log unfortunately patient does require medications at this time.  Started on amlodipine 5 mg.  Did educate the patient that this is likely secondary to her EtOH usage.  Patient states that she will try to cut down  Situational anxiety recommend that the patient initiate another medication however she prefers only to have Xanax as a rescue right now  Hyperlipidemia/all screening labs sent         History of Present Illness     Adult Annual Physical:  Patient presents for annual physical. Patient does admit to drinking alcohol several days a week mostly on the weekends.  States that she has been very stressful with her 's overall health.  She has been using as a rescue the Xanax.  Has been keeping her blood pressure log and noticed that it has been significantly elevated.     Diet and Physical Activity:  - Diet/Nutrition: poor diet. snacker; 3-4 beers ; weekends has alot more.  - Exercise: no formal exercise.    General Health:  - Sleep: 4-6 hours of sleep on average.  - Hearing: normal hearing right ear, normal hearing left ear and tinnitus.  - Vision: no vision problems and wears glasses.  - Dental: regular dental visits.    Review of Systems   Constitutional:  Negative for activity change, appetite change, chills, fatigue and fever.   HENT:  Negative for  "congestion.    Respiratory:  Negative for cough, chest tightness and shortness of breath.    Cardiovascular:  Negative for chest pain and leg swelling.   Gastrointestinal:  Negative for abdominal distention, abdominal pain, constipation, diarrhea, nausea and vomiting.   All other systems reviewed and are negative.        Objective     /84 (BP Location: Left arm, Patient Position: Sitting, Cuff Size: Large)   Pulse 91   Temp 98 °F (36.7 °C) (Temporal)   Resp 18   Ht 5' 4\" (1.626 m)   Wt 96.6 kg (213 lb)   SpO2 95%   BMI 36.56 kg/m²     Physical Exam  Vitals reviewed.   Constitutional:       Appearance: Normal appearance. She is well-developed.   HENT:      Head: Normocephalic and atraumatic.      Right Ear: Tympanic membrane, ear canal and external ear normal. There is no impacted cerumen.      Left Ear: Tympanic membrane, ear canal and external ear normal. There is no impacted cerumen.      Nose: Nose normal.      Mouth/Throat:      Mouth: Mucous membranes are moist.      Pharynx: Oropharynx is clear.   Eyes:      Conjunctiva/sclera: Conjunctivae normal.      Pupils: Pupils are equal, round, and reactive to light.   Cardiovascular:      Rate and Rhythm: Normal rate and regular rhythm.      Heart sounds: Normal heart sounds.   Pulmonary:      Effort: Pulmonary effort is normal.      Breath sounds: Normal breath sounds.   Abdominal:      General: Abdomen is flat. Bowel sounds are normal.      Palpations: Abdomen is soft.   Musculoskeletal:         General: Normal range of motion.      Cervical back: Normal range of motion and neck supple.   Skin:     General: Skin is warm.      Capillary Refill: Capillary refill takes less than 2 seconds.   Neurological:      General: No focal deficit present.      Mental Status: She is alert and oriented to person, place, and time. Mental status is at baseline.   Psychiatric:         Mood and Affect: Mood normal.         Behavior: Behavior normal.         Thought " Content: Thought content normal.         Judgment: Judgment normal.

## 2024-08-31 ENCOUNTER — APPOINTMENT (OUTPATIENT)
Dept: LAB | Facility: HOSPITAL | Age: 64
End: 2024-08-31
Payer: COMMERCIAL

## 2024-08-31 DIAGNOSIS — E78.5 HYPERLIPIDEMIA, UNSPECIFIED HYPERLIPIDEMIA TYPE: ICD-10-CM

## 2024-08-31 DIAGNOSIS — I15.9 SECONDARY HYPERTENSION: ICD-10-CM

## 2024-08-31 DIAGNOSIS — Z13.29 SCREENING FOR THYROID DISORDER: ICD-10-CM

## 2024-08-31 LAB
ALBUMIN SERPL BCG-MCNC: 4.2 G/DL (ref 3.5–5)
ALP SERPL-CCNC: 50 U/L (ref 34–104)
ALT SERPL W P-5'-P-CCNC: 19 U/L (ref 7–52)
ANION GAP SERPL CALCULATED.3IONS-SCNC: 8 MMOL/L (ref 4–13)
AST SERPL W P-5'-P-CCNC: 17 U/L (ref 13–39)
BASOPHILS # BLD AUTO: 0.04 THOUSANDS/ÂΜL (ref 0–0.1)
BASOPHILS NFR BLD AUTO: 1 % (ref 0–1)
BILIRUB SERPL-MCNC: 0.48 MG/DL (ref 0.2–1)
BUN SERPL-MCNC: 13 MG/DL (ref 5–25)
CALCIUM SERPL-MCNC: 9.1 MG/DL (ref 8.4–10.2)
CHLORIDE SERPL-SCNC: 102 MMOL/L (ref 96–108)
CHOLEST SERPL-MCNC: 195 MG/DL
CO2 SERPL-SCNC: 26 MMOL/L (ref 21–32)
CREAT SERPL-MCNC: 0.61 MG/DL (ref 0.6–1.3)
EOSINOPHIL # BLD AUTO: 0.21 THOUSAND/ÂΜL (ref 0–0.61)
EOSINOPHIL NFR BLD AUTO: 4 % (ref 0–6)
ERYTHROCYTE [DISTWIDTH] IN BLOOD BY AUTOMATED COUNT: 12.5 % (ref 11.6–15.1)
GFR SERPL CREATININE-BSD FRML MDRD: 97 ML/MIN/1.73SQ M
GLUCOSE P FAST SERPL-MCNC: 97 MG/DL (ref 65–99)
HCT VFR BLD AUTO: 42.8 % (ref 34.8–46.1)
HDLC SERPL-MCNC: 60 MG/DL
HGB BLD-MCNC: 14.1 G/DL (ref 11.5–15.4)
IMM GRANULOCYTES # BLD AUTO: 0.02 THOUSAND/UL (ref 0–0.2)
IMM GRANULOCYTES NFR BLD AUTO: 0 % (ref 0–2)
LDLC SERPL CALC-MCNC: 114 MG/DL (ref 0–100)
LYMPHOCYTES # BLD AUTO: 1.71 THOUSANDS/ÂΜL (ref 0.6–4.47)
LYMPHOCYTES NFR BLD AUTO: 34 % (ref 14–44)
MCH RBC QN AUTO: 30.9 PG (ref 26.8–34.3)
MCHC RBC AUTO-ENTMCNC: 32.9 G/DL (ref 31.4–37.4)
MCV RBC AUTO: 94 FL (ref 82–98)
MONOCYTES # BLD AUTO: 0.51 THOUSAND/ÂΜL (ref 0.17–1.22)
MONOCYTES NFR BLD AUTO: 10 % (ref 4–12)
NEUTROPHILS # BLD AUTO: 2.59 THOUSANDS/ÂΜL (ref 1.85–7.62)
NEUTS SEG NFR BLD AUTO: 51 % (ref 43–75)
NONHDLC SERPL-MCNC: 135 MG/DL
NRBC BLD AUTO-RTO: 0 /100 WBCS
PLATELET # BLD AUTO: 354 THOUSANDS/UL (ref 149–390)
PMV BLD AUTO: 10.1 FL (ref 8.9–12.7)
POTASSIUM SERPL-SCNC: 4.1 MMOL/L (ref 3.5–5.3)
PROT SERPL-MCNC: 7.2 G/DL (ref 6.4–8.4)
RBC # BLD AUTO: 4.57 MILLION/UL (ref 3.81–5.12)
SODIUM SERPL-SCNC: 136 MMOL/L (ref 135–147)
TRIGL SERPL-MCNC: 106 MG/DL
TSH SERPL DL<=0.05 MIU/L-ACNC: 2 UIU/ML (ref 0.45–4.5)
WBC # BLD AUTO: 5.08 THOUSAND/UL (ref 4.31–10.16)

## 2024-08-31 PROCEDURE — 80061 LIPID PANEL: CPT

## 2024-08-31 PROCEDURE — 84443 ASSAY THYROID STIM HORMONE: CPT

## 2024-08-31 PROCEDURE — 36415 COLL VENOUS BLD VENIPUNCTURE: CPT

## 2024-08-31 PROCEDURE — 85025 COMPLETE CBC W/AUTO DIFF WBC: CPT

## 2024-08-31 PROCEDURE — 80053 COMPREHEN METABOLIC PANEL: CPT

## 2024-09-03 ENCOUNTER — TELEPHONE (OUTPATIENT)
Dept: FAMILY MEDICINE CLINIC | Facility: CLINIC | Age: 64
End: 2024-09-03

## 2024-09-03 NOTE — TELEPHONE ENCOUNTER
----- Message from Ambar Ott MD sent at 9/2/2024 11:56 PM EDT -----  Please advise patient labs are stable, except cholesterol. Recommend watching sugars and carbs to avoid this worsening.

## 2024-09-03 NOTE — TELEPHONE ENCOUNTER
Pt returned missed call.  Relayed message from Dr Odom.  Pt understood and had no questions.  She also confirmed her appt for Monday 9/9/24 with Dr Odom.

## 2024-09-09 ENCOUNTER — OFFICE VISIT (OUTPATIENT)
Dept: FAMILY MEDICINE CLINIC | Facility: CLINIC | Age: 64
End: 2024-09-09
Payer: COMMERCIAL

## 2024-09-09 VITALS
TEMPERATURE: 98 F | DIASTOLIC BLOOD PRESSURE: 70 MMHG | HEIGHT: 64 IN | RESPIRATION RATE: 18 BRPM | WEIGHT: 212.4 LBS | OXYGEN SATURATION: 99 % | SYSTOLIC BLOOD PRESSURE: 130 MMHG | HEART RATE: 97 BPM | BODY MASS INDEX: 36.26 KG/M2

## 2024-09-09 DIAGNOSIS — S00.522S: Primary | ICD-10-CM

## 2024-09-09 DIAGNOSIS — F41.8 SITUATIONAL ANXIETY: ICD-10-CM

## 2024-09-09 DIAGNOSIS — K05.10: Primary | ICD-10-CM

## 2024-09-09 DIAGNOSIS — I15.9 SECONDARY HYPERTENSION: ICD-10-CM

## 2024-09-09 PROCEDURE — 99214 OFFICE O/P EST MOD 30 MIN: CPT | Performed by: FAMILY MEDICINE

## 2024-09-09 RX ORDER — AMOXICILLIN 875 MG
875 TABLET ORAL 2 TIMES DAILY
Qty: 14 TABLET | Refills: 0 | Status: SHIPPED | OUTPATIENT
Start: 2024-09-09 | End: 2024-09-16

## 2024-09-09 NOTE — PROGRESS NOTES
Kinjal Earl 1960 female MRN: [id number]    [facility] PRACTICE OFFICE VISIT  [facility] - [facility]      ASSESSMENT/PLAN  Kinjal Earl is a 63 y.o. female presents to the office for    Diagnoses and all orders for this visit:    Blister of gum with infection, sequela  -     amoxicillin (AMOXIL) 875 mg tablet; Take 1 tablet (875 mg total) by mouth 2 (two) times a day for 7 days    Secondary hypertension    Situational anxiety       Hypertension at this time recommend that the patient continue on blood pressure medications.  Continue to reduce EtOH usage  Anxiety currently stable on Xanax as needed  Blistering of the gums with gingivitis recommend amoxicillin twice a day for 7 days along with Listerine.  And recommend contacting her dentist           Future Appointments   Date Time Provider Department Center   12/12/2024  3:15 PM Ambar Ott MD [facility]          SUBJECTIVE  CC: Follow-up      HPI:  Kinjal Earl is a 63 y.o. female who presents for blood pressure check.  Patient states that sometimes she forgets her pills but her yashira  always reminds her to take it.  Started with an infection in both teeth states that she usually brushes her teeth daily has been using Listerine with no improvement is established with a dentist.  Patient also feels like she has had swollen lymph nodes and difficulty swallowing. Has only taken two Xanax as needed.    Review of Systems   Constitutional:  Negative for activity change, appetite change, chills, fatigue and fever.   HENT:  Positive for dental problem and sore throat. Negative for congestion.    Respiratory:  Negative for cough, chest tightness and shortness of breath.    Cardiovascular:  Negative for chest pain and leg swelling.   Gastrointestinal:  Negative for abdominal distention, abdominal pain, constipation, diarrhea, nausea and vomiting.   All other systems reviewed and are  "negative.      Historical Information   The patient history was reviewed as follows:  Past Medical History:   Diagnosis Date   • Mild asthma without complication    • Primary osteoarthritis of right knee 02/16/2023   • Seasonal allergies          Medications:     Current Outpatient Medications:   •  ALPRAZolam (XANAX) 0.25 mg tablet, Take 1 tablet (0.25 mg total) by mouth daily at bedtime as needed for anxiety, Disp: 15 tablet, Rfl: 0  •  amLODIPine (NORVASC) 5 mg tablet, Take 1 tablet (5 mg total) by mouth daily, Disp: 30 tablet, Rfl: 0  •  amoxicillin (AMOXIL) 875 mg tablet, Take 1 tablet (875 mg total) by mouth 2 (two) times a day for 7 days, Disp: 14 tablet, Rfl: 0  •  fluticasone (Flovent HFA) 110 MCG/ACT inhaler, INHALE TWO PUFFS BY MOUTH TWICE A DAY RINSE MOUTH AFTER USE, Disp: 12 g, Rfl: 1  •  latanoprost (XALATAN) 0.005 % ophthalmic solution, , Disp: , Rfl:   •  Naproxen Sodium (ALEVE PO), Take by mouth, Disp: , Rfl:   •  albuterol (Proventil HFA) 90 mcg/act inhaler, Inhale 2 puffs every 6 (six) hours as needed for wheezing (Patient not taking: Reported on 8/19/2024), Disp: 6.7 g, Rfl: 5    No Known Allergies    OBJECTIVE  Vitals:   Vitals:    09/09/24 0956   BP: 130/70   Pulse: 97   Resp: 18   Temp: 98 °F (36.7 °C)   TempSrc: Temporal   SpO2: 99%   Weight: 96.3 kg (212 lb 6.4 oz)   Height: 5' 4\" (1.626 m)         Physical Exam  Vitals reviewed.   Constitutional:       Appearance: She is well-developed.   HENT:      Head: Normocephalic and atraumatic.      Mouth/Throat:      Comments: Front teeth  x 3 inflammed gums  Eyes:      Extraocular Movements: EOM normal.      Conjunctiva/sclera: Conjunctivae normal.      Pupils: Pupils are equal, round, and reactive to light.   Neck:      Comments: Left neck gland swelling     Cardiovascular:      Rate and Rhythm: Normal rate and regular rhythm.      Heart sounds: Normal heart sounds, S1 normal and S2 normal. No murmur heard.  Pulmonary:      Effort: Pulmonary " effort is normal. No respiratory distress.      Breath sounds: Normal breath sounds. No wheezing.   Musculoskeletal:         General: No edema. Normal range of motion.      Cervical back: Normal range of motion and neck supple.   Skin:     General: Skin is warm.      Comments: Abrasion not infection on right nose canal   Neurological:      Mental Status: She is alert and oriented to person, place, and time.   Psychiatric:         Mood and Affect: Mood and affect normal.         Speech: Speech normal.         Behavior: Behavior normal.         Thought Content: Thought content normal.         Judgment: Judgment normal.                    Ambar Odom MD,   [facility]  9/9/2024

## 2024-09-23 DIAGNOSIS — I15.9 SECONDARY HYPERTENSION: ICD-10-CM

## 2024-09-24 RX ORDER — AMLODIPINE BESYLATE 5 MG/1
5 TABLET ORAL DAILY
Qty: 90 TABLET | Refills: 2 | Status: SHIPPED | OUTPATIENT
Start: 2024-09-24

## 2024-09-24 NOTE — TELEPHONE ENCOUNTER
Refill must be reviewed and completed by the office or provider. The refill is unable to be approved or denied by the medication management team.    Patient was to return in 2W for BP check, appointment 12.2024 - Please review to see if the refill is appropriate.   Return in about 2 weeks (around 9/2/2024) for For BP check/EKG

## 2024-10-18 DIAGNOSIS — F41.8 SITUATIONAL ANXIETY: ICD-10-CM

## 2024-10-18 RX ORDER — ALPRAZOLAM 0.25 MG/1
0.25 TABLET ORAL
Qty: 15 TABLET | Refills: 0 | Status: SHIPPED | OUTPATIENT
Start: 2024-10-18

## 2024-11-21 ENCOUNTER — TELEPHONE (OUTPATIENT)
Dept: FAMILY MEDICINE CLINIC | Facility: CLINIC | Age: 64
End: 2024-11-21

## 2024-11-21 NOTE — TELEPHONE ENCOUNTER
Pt called asking if you received forms for her FMLA ends 12-19-24 for [employer] she needs them faxed by Friday for next year.

## 2024-11-26 NOTE — TELEPHONE ENCOUNTER
Printed FMLA forms for patient to pickup copy of completed forms. No further action required  Una Knapp

## 2024-12-05 NOTE — TELEPHONE ENCOUNTER
Called patient and advised that Dr Odom just returned to office today after being out for over 1 week and that she is seeing patient and will review messages and forms when she is able to.

## 2024-12-05 NOTE — TELEPHONE ENCOUNTER
Patient is calling to check on status of paper work. Please follow up with patient. Thank you in advance.

## 2024-12-06 NOTE — TELEPHONE ENCOUNTER
Spoke with patient, dates for form are 12/20/24-12/19/25. Noted on form and faxed. Patient will  copy next week.

## 2024-12-06 NOTE — TELEPHONE ENCOUNTER
Left 2 voice messages for the patient to call me back.  I filled out the paperwork for her but I left flank the start date of the leave and the end date because I did not know if I should go off of the start date being December 15, 2024 given that the old one ended on December 14, 2024

## 2024-12-12 ENCOUNTER — TELEPHONE (OUTPATIENT)
Age: 64
End: 2024-12-12

## 2024-12-12 ENCOUNTER — TELEPHONE (OUTPATIENT)
Dept: OTHER | Facility: OTHER | Age: 64
End: 2024-12-12

## 2024-12-12 ENCOUNTER — OFFICE VISIT (OUTPATIENT)
Dept: FAMILY MEDICINE CLINIC | Facility: CLINIC | Age: 64
End: 2024-12-12
Payer: COMMERCIAL

## 2024-12-12 VITALS
SYSTOLIC BLOOD PRESSURE: 140 MMHG | TEMPERATURE: 97.3 F | HEART RATE: 70 BPM | DIASTOLIC BLOOD PRESSURE: 80 MMHG | RESPIRATION RATE: 18 BRPM | OXYGEN SATURATION: 97 % | HEIGHT: 63 IN | BODY MASS INDEX: 38.91 KG/M2 | WEIGHT: 219.6 LBS

## 2024-12-12 DIAGNOSIS — J45.909 MILD ASTHMA WITHOUT COMPLICATION, UNSPECIFIED WHETHER PERSISTENT: ICD-10-CM

## 2024-12-12 DIAGNOSIS — I10 HYPERTENSION, UNSPECIFIED TYPE: Primary | ICD-10-CM

## 2024-12-12 DIAGNOSIS — F41.8 SITUATIONAL ANXIETY: ICD-10-CM

## 2024-12-12 DIAGNOSIS — E78.5 HYPERLIPIDEMIA, UNSPECIFIED HYPERLIPIDEMIA TYPE: ICD-10-CM

## 2024-12-12 DIAGNOSIS — J45.20 MILD INTERMITTENT ASTHMA WITHOUT COMPLICATION: ICD-10-CM

## 2024-12-12 PROCEDURE — 99214 OFFICE O/P EST MOD 30 MIN: CPT | Performed by: FAMILY MEDICINE

## 2024-12-12 RX ORDER — FLUTICASONE PROPIONATE 110 UG/1
2 AEROSOL, METERED RESPIRATORY (INHALATION) 2 TIMES DAILY
Qty: 12 G | Refills: 1 | Status: SHIPPED | OUTPATIENT
Start: 2024-12-12

## 2024-12-12 RX ORDER — TAFLUPROST OPTHALMIC 0 MG/.3ML
SOLUTION/ DROPS OPHTHALMIC
COMMUNITY
Start: 2024-11-22

## 2024-12-12 RX ORDER — DORZOLAMIDE HYDROCHLORIDE AND TIMOLOL MALEATE PRESERVATIVE FREE 20; 5 MG/ML; MG/ML
SOLUTION/ DROPS OPHTHALMIC
COMMUNITY
Start: 2024-12-05

## 2024-12-12 RX ORDER — ALPRAZOLAM 0.25 MG/1
0.25 TABLET ORAL
Qty: 15 TABLET | Refills: 0 | Status: SHIPPED | OUTPATIENT
Start: 2024-12-12

## 2024-12-12 RX ORDER — LEVOFLOXACIN 500 MG/1
TABLET, FILM COATED ORAL
COMMUNITY
Start: 2024-12-09

## 2024-12-12 RX ORDER — ALBUTEROL SULFATE 90 UG/1
2 INHALANT RESPIRATORY (INHALATION) EVERY 6 HOURS PRN
Qty: 6.7 G | Refills: 5 | Status: SHIPPED | OUTPATIENT
Start: 2024-12-12

## 2024-12-12 NOTE — TELEPHONE ENCOUNTER
PT called to inform Dr watt about the 2 eye drops she is takin) Dorzolamide ASL timolol 2-0.5 soln  2) Tafluprost PF 0.001-5% soln    ThankYou

## 2024-12-12 NOTE — PROGRESS NOTES
Kinjal Earl 1960 female MRN: [id number]    [facility] PRACTICE OFFICE VISIT  [facility]      ASSESSMENT/PLAN  Kinjal Earl is a 64 y.o. female presents to the office for    Diagnoses and all orders for this visit:    Hypertension, unspecified type    Hyperlipidemia, unspecified hyperlipidemia type    Mild intermittent asthma without complication  -     fluticasone (Flovent HFA) 110 MCG/ACT inhaler; Inhale 2 puffs 2 (two) times a day Rinse mouth after use.    Situational anxiety  -     ALPRAZolam (XANAX) 0.25 mg tablet; Take 1 tablet (0.25 mg total) by mouth daily at bedtime as needed for anxiety    Mild asthma without complication, unspecified whether persistent  -     albuterol (Proventil HFA) 90 mcg/act inhaler; Inhale 2 puffs every 6 (six) hours as needed for wheezing    Other orders  -     levofloxacin (LEVAQUIN) 500 mg tablet  -     Dorzolamide HCl-Timolol Mal PF 2-0.5 % SOLN  -     Tafluprost, PF, 0.0015 % SOLN    Patient has no family history of thyroid cancer I strongly believe that the patient  Should be started on weight loss medications.  Patient has been trying to modify her diet but with no success.  Patient will go home and think about possibly being placed on a GLP-1.  Situational anxiety given that she has been caring for her .  Continue on Xanax as a rescue.  Does not want a controlled daily medication at this time.  Asthma is currently stable             Future Appointments   Date Time Provider Department Center   6/12/2025  2:30 PM Ambar Ott MD [facility]          SUBJECTIVE  CC: Blood Pressure Check      HPI:  Kinjal Earl is a 64 y.o. female who presents for an follow-up on chronic conditions.  Patient states that her blood pressure has been stable on the 5 mg.  She states that she denies any dizziness or lightheadedness.  She does still have anxiety specifically when caring for her .  Patient states  she has been trying to modify her diet for several years now without any success.  Review of Systems   Constitutional:  Negative for activity change, appetite change, chills, fatigue and fever.   HENT:  Negative for congestion.    Respiratory:  Negative for cough, chest tightness and shortness of breath.    Cardiovascular:  Negative for chest pain and leg swelling.   Gastrointestinal:  Negative for abdominal distention, abdominal pain, constipation, diarrhea, nausea and vomiting.   All other systems reviewed and are negative.      Historical Information   The patient history was reviewed as follows:  Past Medical History:   Diagnosis Date   • Mild asthma without complication    • Primary osteoarthritis of right knee 02/16/2023   • Seasonal allergies          Medications:     Current Outpatient Medications:   •  albuterol (Proventil HFA) 90 mcg/act inhaler, Inhale 2 puffs every 6 (six) hours as needed for wheezing, Disp: 6.7 g, Rfl: 5  •  ALPRAZolam (XANAX) 0.25 mg tablet, Take 1 tablet (0.25 mg total) by mouth daily at bedtime as needed for anxiety, Disp: 15 tablet, Rfl: 0  •  amLODIPine (NORVASC) 5 mg tablet, Take 1 tablet (5 mg total) by mouth daily, Disp: 90 tablet, Rfl: 2  •  fluticasone (Flovent HFA) 110 MCG/ACT inhaler, Inhale 2 puffs 2 (two) times a day Rinse mouth after use., Disp: 12 g, Rfl: 1  •  latanoprost (XALATAN) 0.005 % ophthalmic solution, , Disp: , Rfl:   •  levofloxacin (LEVAQUIN) 500 mg tablet, , Disp: , Rfl:   •  Dorzolamide HCl-Timolol Mal PF 2-0.5 % SOLN, , Disp: , Rfl:   •  Tafluprost, PF, 0.0015 % SOLN, , Disp: , Rfl:   •  tirzepatide (Zepbound) 2.5 mg/0.5 mL auto-injector, Inject 0.5 mL (2.5 mg total) under the skin once a week for 28 days, Disp: 2 mL, Rfl: 0    No Known Allergies    OBJECTIVE  Vitals:   Vitals:    12/12/24 1444   BP: 140/80   BP Location: Left arm   Patient Position: Sitting   Cuff Size: Large   Pulse: 70   Resp: 18   Temp: (!) 97.3 °F (36.3 °C)   TempSrc: Temporal   SpO2:  "97%   Weight: 99.6 kg (219 lb 9.6 oz)   Height: 5' 3\" (1.6 m)         Physical Exam  Vitals reviewed.   Constitutional:       Appearance: She is well-developed.   HENT:      Head: Normocephalic and atraumatic.   Eyes:      Conjunctiva/sclera: Conjunctivae normal.      Pupils: Pupils are equal, round, and reactive to light.   Cardiovascular:      Rate and Rhythm: Normal rate and regular rhythm.      Heart sounds: Normal heart sounds, S1 normal and S2 normal. No murmur heard.  Pulmonary:      Effort: Pulmonary effort is normal. No respiratory distress.      Breath sounds: Normal breath sounds. No wheezing.   Musculoskeletal:         General: Normal range of motion.      Cervical back: Normal range of motion and neck supple.   Skin:     General: Skin is warm.   Neurological:      Mental Status: She is alert and oriented to person, place, and time.   Psychiatric:         Speech: Speech normal.         Behavior: Behavior normal.         Thought Content: Thought content normal.         Judgment: Judgment normal.                    Ambar Ott MD,   [facility]  12/22/2024      "

## 2024-12-19 ENCOUNTER — TELEPHONE (OUTPATIENT)
Age: 64
End: 2024-12-19

## 2024-12-19 DIAGNOSIS — E78.5 HYPERLIPIDEMIA, UNSPECIFIED HYPERLIPIDEMIA TYPE: ICD-10-CM

## 2024-12-19 DIAGNOSIS — I10 HYPERTENSION, UNSPECIFIED TYPE: Primary | ICD-10-CM

## 2024-12-19 RX ORDER — TIRZEPATIDE 2.5 MG/.5ML
2.5 INJECTION, SOLUTION SUBCUTANEOUS WEEKLY
Qty: 2 ML | Refills: 0 | Status: SHIPPED | OUTPATIENT
Start: 2024-12-19 | End: 2025-01-16

## 2024-12-19 NOTE — TELEPHONE ENCOUNTER
Hi, If you can please have the doctor sign the office notes for dos 12/12/24 so we can process the PA for the patients medication. Please let the PA team know once completed. Thank you

## 2024-12-24 NOTE — TELEPHONE ENCOUNTER
PA for ZEPBOUND 2.5MG SUBMITTED to [employer]    via    []CMM-KEY:   [x]Surescripts-Case ID # [id number]   []Availity-Auth ID # NDC #   []Faxed to plan   []Other website   []Phone call Case ID #     [x]PA sent as URGENT    All office notes, labs and other pertaining documents and studies sent. Clinical questions answered. Awaiting determination from insurance company.     Turnaround time for your insurance to make a decision on your Prior Authorization can take 7-21 business days.

## 2024-12-26 NOTE — TELEPHONE ENCOUNTER
Hi Dr. Odom, insurance denied [facility] PA, there needs to be documentation that she's tried/participated in a weight loss program. If she has tried this, when you have a moment, please addend your notes and I can either resubmit or appeal.  Thank you!

## 2024-12-26 NOTE — TELEPHONE ENCOUNTER
SS closed PA, requested PA be RS.      PA Zepbound 2.5 MG/0.5ML RESUBMITTED     to [facility]     via    []CMM-KEY:    [x]Surescripts-Case ID # [id number]  []Availity-Auth ID #  NDC #    []Faxed to plan   []Other website    []Phone call Case ID #      []PA sent as URGENT    All office notes, labs and other pertaining documents and studies sent. Clinical questions answered. Awaiting determination from insurance company.     Turnaround time for your insurance to make a decision on your Prior Authorization can take 7-21 business days.

## 2024-12-26 NOTE — TELEPHONE ENCOUNTER
PA Zepbound 2.5 MG/0.5ML DENIED    Reason:(Screenshot if applicable)        Message sent to office clinical pool Yes    **Please follow up with your patient regarding denial and next steps**

## 2024-12-27 NOTE — TELEPHONE ENCOUNTER
PA Zepbound 2.5 MG/0.5ML APPEALED via     []CMM  []SS  [x]Letter sent to insurance via fax [phone]   []Other site or means      All necessary records sent. Will await response from insurance company    Turnaround time for a decision to be made on an appeal could take up to 30 business days

## 2025-02-19 DIAGNOSIS — F41.8 SITUATIONAL ANXIETY: ICD-10-CM

## 2025-02-19 RX ORDER — ALPRAZOLAM 0.25 MG/1
0.25 TABLET ORAL
Qty: 15 TABLET | Refills: 0 | Status: SHIPPED | OUTPATIENT
Start: 2025-02-19

## 2025-04-01 DIAGNOSIS — F41.8 SITUATIONAL ANXIETY: ICD-10-CM

## 2025-04-02 RX ORDER — ALPRAZOLAM 0.25 MG
TABLET ORAL
Qty: 15 TABLET | Refills: 0 | Status: SHIPPED | OUTPATIENT
Start: 2025-04-02

## 2025-04-09 ENCOUNTER — NEW PATIENT COMPREHENSIVE (OUTPATIENT)
Dept: URBAN - METROPOLITAN AREA CLINIC 6 | Facility: CLINIC | Age: 65
End: 2025-04-09

## 2025-04-09 DIAGNOSIS — H25.813: ICD-10-CM

## 2025-04-09 DIAGNOSIS — H43.813: ICD-10-CM

## 2025-04-09 DIAGNOSIS — H40.1132: ICD-10-CM

## 2025-04-09 PROCEDURE — 99204 OFFICE O/P NEW MOD 45 MIN: CPT

## 2025-04-09 PROCEDURE — 92134 CPTRZ OPH DX IMG PST SGM RTA: CPT | Mod: NC

## 2025-04-09 PROCEDURE — 92133 CPTRZD OPH DX IMG PST SGM ON: CPT

## 2025-04-09 PROCEDURE — 92020 GONIOSCOPY: CPT

## 2025-04-09 PROCEDURE — 76514 ECHO EXAM OF EYE THICKNESS: CPT

## 2025-04-09 ASSESSMENT — TONOMETRY
OD_IOP_MMHG: 10
OS_IOP_MMHG: 11

## 2025-04-09 ASSESSMENT — VISUAL ACUITY
OD_CC: 20/40
OS_CC: 20/40

## 2025-04-09 ASSESSMENT — PACHYMETRY
OS_CT_UM: 509
OD_CT_UM: 509

## 2025-04-18 DIAGNOSIS — I15.9 SECONDARY HYPERTENSION: ICD-10-CM

## 2025-04-18 DIAGNOSIS — R03.0 PREHYPERTENSION: ICD-10-CM

## 2025-04-18 DIAGNOSIS — J45.20 MILD INTERMITTENT ASTHMA WITHOUT COMPLICATION: ICD-10-CM

## 2025-04-18 DIAGNOSIS — J45.909 MILD ASTHMA WITHOUT COMPLICATION, UNSPECIFIED WHETHER PERSISTENT: ICD-10-CM

## 2025-04-18 DIAGNOSIS — E78.5 HYPERLIPIDEMIA, UNSPECIFIED HYPERLIPIDEMIA TYPE: ICD-10-CM

## 2025-04-18 DIAGNOSIS — I10 HYPERTENSION, UNSPECIFIED TYPE: Primary | ICD-10-CM

## 2025-04-18 RX ORDER — TIRZEPATIDE 2.5 MG/.5ML
2.5 INJECTION, SOLUTION SUBCUTANEOUS WEEKLY
Qty: 2 ML | Refills: 0 | Status: SHIPPED | OUTPATIENT
Start: 2025-04-18 | End: 2025-05-16

## 2025-04-18 NOTE — PROGRESS NOTES
Patient is curious given that we never got a result of her weight loss medication that we wanted to initiate.  She has been walking and exercising for at least 6 months and doing significant diet lifestyle modifications with no improvement.  So I been waiting to make sure she is approved for Zepbound given her history of asthma as well as high cholesterol and hypertension

## 2025-04-22 ENCOUNTER — TELEPHONE (OUTPATIENT)
Age: 65
End: 2025-04-22

## 2025-04-22 NOTE — TELEPHONE ENCOUNTER
PA for Zepbound 2.5mg SUBMITTED to [facility]    via    []CMM-KEY:   [x]Surescripts-Case ID # [id number]   []Availity-Auth ID # NDC #   []Faxed to plan   []Other website   []Phone call Case ID #     [x]PA sent as URGENT    All office notes, labs and other pertaining documents and studies sent. Clinical questions answered. Awaiting determination from insurance company.     Turnaround time for your insurance to make a decision on your Prior Authorization can take 7-21 business days.

## 2025-04-24 NOTE — TELEPHONE ENCOUNTER
PA  Zepbound 2.5mg DENIED    Reason:(Screenshot if applicable)        Message sent to office clinical pool Yes    Denial letter scanned into Media Yes    Appeal started No (Provider will need to decide if appeal is warranted and send clinical documentation to Prior Authorization Team for initiation.)    **Please follow up with your patient regarding denial and next steps**

## 2025-04-24 NOTE — TELEPHONE ENCOUNTER
Dr. Odom, we'll need Kinjal to come in for a weight follow up appointment with you. Insurance needs updated weight & bmi as well as chart notes showing she's been educated and has completed dieting and exercise. We can resubmit after she is seen. She may have to also go up to the 5 mg.

## 2025-05-08 DIAGNOSIS — F41.8 SITUATIONAL ANXIETY: ICD-10-CM

## 2025-05-08 RX ORDER — ALPRAZOLAM 0.25 MG
0.25 TABLET ORAL
Qty: 30 TABLET | Refills: 0 | Status: SHIPPED | OUTPATIENT
Start: 2025-05-08

## 2025-06-12 ENCOUNTER — OFFICE VISIT (OUTPATIENT)
Dept: FAMILY MEDICINE CLINIC | Facility: CLINIC | Age: 65
End: 2025-06-12
Payer: COMMERCIAL

## 2025-06-12 VITALS
HEIGHT: 63 IN | WEIGHT: 221 LBS | OXYGEN SATURATION: 97 % | SYSTOLIC BLOOD PRESSURE: 140 MMHG | TEMPERATURE: 98 F | DIASTOLIC BLOOD PRESSURE: 80 MMHG | RESPIRATION RATE: 14 BRPM | HEART RATE: 72 BPM | BODY MASS INDEX: 39.16 KG/M2

## 2025-06-12 DIAGNOSIS — F41.8 SITUATIONAL ANXIETY: ICD-10-CM

## 2025-06-12 DIAGNOSIS — E78.5 HYPERLIPIDEMIA, UNSPECIFIED HYPERLIPIDEMIA TYPE: Primary | ICD-10-CM

## 2025-06-12 DIAGNOSIS — I15.9 SECONDARY HYPERTENSION: ICD-10-CM

## 2025-06-12 DIAGNOSIS — E66.9 OBESITY (BMI 30-39.9): ICD-10-CM

## 2025-06-12 DIAGNOSIS — J45.20 MILD INTERMITTENT ASTHMA WITHOUT COMPLICATION: ICD-10-CM

## 2025-06-12 DIAGNOSIS — M54.2 NECK PAIN: ICD-10-CM

## 2025-06-12 PROCEDURE — 99214 OFFICE O/P EST MOD 30 MIN: CPT | Performed by: FAMILY MEDICINE

## 2025-06-12 RX ORDER — ALPRAZOLAM 0.25 MG
0.25 TABLET ORAL
Qty: 30 TABLET | Refills: 0 | Status: SHIPPED | OUTPATIENT
Start: 2025-06-12

## 2025-06-12 RX ORDER — CYCLOBENZAPRINE HCL 10 MG
10 TABLET ORAL
Qty: 30 TABLET | Refills: 0 | Status: SHIPPED | OUTPATIENT
Start: 2025-06-12

## 2025-06-12 RX ORDER — TIRZEPATIDE 2.5 MG/.5ML
2.5 INJECTION, SOLUTION SUBCUTANEOUS WEEKLY
Qty: 2 ML | Refills: 0 | Status: SHIPPED | OUTPATIENT
Start: 2025-06-12 | End: 2025-07-10

## 2025-06-12 NOTE — PROGRESS NOTES
Name: Kinjal Earl      : 1960      MRN: [id number]  Encounter Provider: Ambar Ott MD  Encounter Date: 2025   Encounter department: [facility]    Assessment & Plan  Situational anxiety  Refilled medications for the patient today  Orders:  •  ALPRAZolam (XANAX) 0.25 mg tablet; Take 1 tablet (0.25 mg total) by mouth daily at bedtime as needed for anxiety  •  tirzepatide (Zepbound) 2.5 mg/0.5 mL auto-injector; Inject 0.5 mL (2.5 mg total) under the skin once a week for 28 days    Hyperlipidemia, unspecified hyperlipidemia type  Strongly believe that Zepbound should be approved for the patient.  Overall would help with her blood pressure obesity as well as her history of asthma  Orders:  •  tirzepatide (Zepbound) 2.5 mg/0.5 mL auto-injector; Inject 0.5 mL (2.5 mg total) under the skin once a week for 28 days    Secondary hypertension    Orders:  •  tirzepatide (Zepbound) 2.5 mg/0.5 mL auto-injector; Inject 0.5 mL (2.5 mg total) under the skin once a week for 28 days    Obesity (BMI 30-39.9)      Orders:  •  tirzepatide (Zepbound) 2.5 mg/0.5 mL auto-injector; Inject 0.5 mL (2.5 mg total) under the skin once a week for 28 days    Neck pain    Orders:  •  cyclobenzaprine (FLEXERIL) 10 mg tablet; Take 1 tablet (10 mg total) by mouth daily at bedtime as needed for muscle spasms    Mild intermittent asthma without complication              History of Present Illness     HPI  64-year-old female presenting to the office for follow-up on chronic conditions.  Overall patient states that she has not been able to lose weight.  Has been very active with using apps and dieting.  Patient states that she feels like her anxiety is controlled and would like a refill of her Xanax.  Uses it only as needed.  Takes all her other medications as prescribed without any difficulties.  Review of Systems   Constitutional:  Negative for activity change, appetite change, chills, fatigue and fever.  "  HENT:  Negative for congestion.    Respiratory:  Negative for cough, chest tightness and shortness of breath.    Cardiovascular:  Negative for chest pain and leg swelling.   Gastrointestinal:  Negative for abdominal distention, abdominal pain, constipation, diarrhea, nausea and vomiting.   All other systems reviewed and are negative.    Past Medical History[1]  Past Surgical History[2]  Family History[3]  Social History[4]  Medications[5]  No Known Allergies  Immunization History   Administered Date(s) Administered   • COVID-19 PFIZER VACCINE 0.3 ML IM 04/17/2021, 05/08/2021     Objective   /80 (BP Location: Left arm, Patient Position: Sitting, Cuff Size: Adult)   Pulse 72   Temp 98 °F (36.7 °C) (Temporal)   Resp 14   Ht 5' 3\" (1.6 m)   Wt 100 kg (221 lb)   SpO2 97%   BMI 39.15 kg/m²     Physical Exam  Constitutional:       Appearance: She is well-developed.   HENT:      Head: Normocephalic and atraumatic.   Pulmonary:      Effort: Pulmonary effort is normal.     Musculoskeletal:         General: Normal range of motion.     Neurological:      Mental Status: She is alert and oriented to person, place, and time.     Psychiatric:         Behavior: Behavior normal.         Thought Content: Thought content normal.         Judgment: Judgment normal.                [1]  Past Medical History:  Diagnosis Date   • Mild asthma without complication    • Primary osteoarthritis of right knee 02/16/2023   • Seasonal allergies    [2]  Past Surgical History:  Procedure Laterality Date   • JOINT REPLACEMENT Left     knee   [3]  Family History  Problem Relation Name Age of Onset   • Melanoma Mother     • Diabetes Mother     • Heart disease Mother     • Cancer Mother     • Hypertension Father     • No Known Problems Sister     • No Known Problems Sister     • No Known Problems Sister     • No Known Problems Sister     • No Known Problems Sister     • Colon cancer Paternal Aunt     • Melanoma Paternal Uncle     • Breast " cancer Maternal Aunt  50   • No Known Problems Brother     [4]  Social History  Tobacco Use   • Smoking status: Never   • Smokeless tobacco: Never   Vaping Use   • Vaping status: Never Used   Substance and Sexual Activity   • Alcohol use: Yes   • Drug use: Not Currently   [5]  Current Outpatient Medications on File Prior to Visit   Medication Sig   • albuterol (Proventil HFA) 90 mcg/act inhaler Inhale 2 puffs every 6 (six) hours as needed for wheezing   • amLODIPine (NORVASC) 5 mg tablet Take 1 tablet (5 mg total) by mouth daily   • Dorzolamide HCl-Timolol Mal PF 2-0.5 % SOLN    • fluticasone (Flovent HFA) 110 MCG/ACT inhaler Inhale 2 puffs 2 (two) times a day Rinse mouth after use.   • latanoprost (XALATAN) 0.005 % ophthalmic solution    • Tafluprost, PF, 0.0015 % SOLN    • levofloxacin (LEVAQUIN) 500 mg tablet  (Patient not taking: Reported on 6/12/2025)

## 2025-06-18 ENCOUNTER — TELEPHONE (OUTPATIENT)
Age: 65
End: 2025-06-18

## 2025-06-18 NOTE — LETTER
ATTN: Appeals Dept     Patient: Kinjal Earl :1960 Member ID: [id number]    Re: Request for Reconsideration of Zepbound 2.5 MG/0.5ML       To Whom It May Concern:   Kinjal Earl 1960 was denied coverage for the medication Zepbound 2.5 MG/0.5ML on 25. The denial reason was stated as not covered due to not meeting insurance criteria. I am requesting a redetermination of the denial of coverage due to overall my patient states that she has not been able to lose weight.  Has been very active with using apps and dieting. She has tried various weight loss programs and dieting/exercise for over a year with no luck. Her current weight is 221 lbs and her BMI is 39.15       In conclusion, please reconsider the denial of Zepbound 2.5 MG/0.5ML for my patient. I would appreciate prompt review of this appeal and approval of this FDA-approved medication. I can be reached by phone at [phone] or via fax at [phone] for additional information and discussion. Thank you.      Sincerely,   Ambar Ott MD

## 2025-06-18 NOTE — TELEPHONE ENCOUNTER
Dr. Odom, per her insurance, I have to appeal it instead of submitting again.     The denial needs information that she's tried a weight loss program, which much include low ruth diet, physical activity, and behavioral changes. And they need documentation if patient has participate in any weight loss program, like weight watches, noom, etc. I called and verified with her plan and that is a plan requirement.   Would you be able to addend your note with any information that could help get this approved, or if patient has previous records or receipts of the weight loss plans.

## 2025-06-19 NOTE — TELEPHONE ENCOUNTER
PA Zepbound 2.5 MG/0.5ML APPEALED     []CMM  []SS  [x]Letter sent to insurance via fax [phone]  []Other site or means      All necessary records sent. Will await response from insurance company    Turnaround time for a decision to be made on an appeal could take up to 30 business days

## 2025-07-07 ENCOUNTER — TELEPHONE (OUTPATIENT)
Dept: OTHER | Facility: HOSPITAL | Age: 65
End: 2025-07-07

## 2025-07-07 NOTE — TELEPHONE ENCOUNTER
Pharmacy stated that Patient is only on two medications Xanax and cyclobenzaprine. Patient just had cyclobenzaprine refilled 06/12/2025.

## 2025-07-07 NOTE — TELEPHONE ENCOUNTER
Patient stated that she is looking for Zepbound to be prescribed as her PA appeal went through. Patient aware that you are out of the office. Please send in.

## 2025-07-07 NOTE — TELEPHONE ENCOUNTER
Patient called the rx line. Patient said the pharmacy is wait for Dr. Odom to give them a call. I asked the patient what is the name of the medication. Patient said she don't know, ask Dr. Odom. [id number] [place] [place] - [address] [place] [address] [address] [facility]

## 2025-07-09 DIAGNOSIS — E78.5 HYPERLIPIDEMIA, UNSPECIFIED HYPERLIPIDEMIA TYPE: Primary | ICD-10-CM

## 2025-07-09 DIAGNOSIS — I10 HYPERTENSION, UNSPECIFIED TYPE: ICD-10-CM

## 2025-07-09 DIAGNOSIS — E66.9 OBESITY (BMI 30-39.9): ICD-10-CM

## 2025-07-09 DIAGNOSIS — I15.9 SECONDARY HYPERTENSION: ICD-10-CM

## 2025-07-09 DIAGNOSIS — J45.20 MILD INTERMITTENT ASTHMA WITHOUT COMPLICATION: ICD-10-CM

## 2025-07-09 RX ORDER — TIRZEPATIDE 2.5 MG/.5ML
2.5 INJECTION, SOLUTION SUBCUTANEOUS WEEKLY
Qty: 2 ML | Refills: 0 | Status: SHIPPED | OUTPATIENT
Start: 2025-07-09 | End: 2025-08-06

## 2025-07-14 DIAGNOSIS — F41.8 SITUATIONAL ANXIETY: ICD-10-CM

## 2025-07-14 RX ORDER — ALPRAZOLAM 0.25 MG
0.25 TABLET ORAL
Qty: 30 TABLET | Refills: 0 | Status: SHIPPED | OUTPATIENT
Start: 2025-07-14 | End: 2025-07-30

## 2025-07-14 NOTE — TELEPHONE ENCOUNTER
Reason for call:   [x] Refill   [] Prior Auth  [] Other:     Office:   [x] PCP/Provider -   [] Specialty/Provider -     Medication: Alprazolam    Dose/Frequency: 0.25 mg    Quantity: 30 tablets    Pharmacy: [facility] [id number] [place], [place] - [address] [facility] [address] [facility] [address]      [facility]   Does the patient have enough for 3 days?   [] Yes   [x] No - Send as HP to POD    [facility] Pharmacy   Does the patient have enough for 10 days?   [] Yes   [] No - Send as HP to POD

## 2025-07-17 ENCOUNTER — TELEPHONE (OUTPATIENT)
Dept: FAMILY MEDICINE CLINIC | Facility: CLINIC | Age: 65
End: 2025-07-17

## 2025-07-17 NOTE — TELEPHONE ENCOUNTER
Received FMLA forms for the patient. DId she want me to fill this out again? I feel like we just submitted them

## 2025-07-29 DIAGNOSIS — F41.8 SITUATIONAL ANXIETY: ICD-10-CM

## 2025-07-30 RX ORDER — ALPRAZOLAM 0.25 MG
TABLET ORAL
Qty: 30 TABLET | Refills: 0 | Status: SHIPPED | OUTPATIENT
Start: 2025-07-30